# Patient Record
Sex: FEMALE | Race: WHITE | Employment: OTHER | URBAN - METROPOLITAN AREA
[De-identification: names, ages, dates, MRNs, and addresses within clinical notes are randomized per-mention and may not be internally consistent; named-entity substitution may affect disease eponyms.]

---

## 2018-03-21 ENCOUNTER — HOSPITAL ENCOUNTER (OUTPATIENT)
Age: 63
Discharge: HOME | End: 2018-03-23
Payer: MEDICARE

## 2018-03-21 ENCOUNTER — HOSPITAL ENCOUNTER (OUTPATIENT)
Dept: GENERAL RADIOLOGY | Age: 63
Discharge: HOME | End: 2018-03-23
Payer: MEDICARE

## 2018-03-21 DIAGNOSIS — R05.9 COUGH: ICD-10-CM

## 2018-03-21 PROCEDURE — 71046 X-RAY EXAM CHEST 2 VIEWS: CPT

## 2018-03-27 ENCOUNTER — HOSPITAL ENCOUNTER (OUTPATIENT)
Dept: GENERAL RADIOLOGY | Age: 63
Discharge: HOME | End: 2018-03-29
Payer: MEDICARE

## 2018-03-27 DIAGNOSIS — I88.9 AXILLARY LYMPHADENITIS: ICD-10-CM

## 2018-03-27 RX ORDER — HYDROXYZINE HYDROCHLORIDE 25 MG/1
25 TABLET, FILM COATED ORAL 3 TIMES DAILY PRN
COMMUNITY

## 2018-03-27 RX ORDER — DOXYCYCLINE HYCLATE 100 MG
100 TABLET ORAL 2 TIMES DAILY
COMMUNITY

## 2018-03-29 ENCOUNTER — PREP FOR PROCEDURE (OUTPATIENT)
Dept: UROLOGY | Age: 63
End: 2018-03-29

## 2018-03-29 RX ORDER — SODIUM CHLORIDE 0.9 % (FLUSH) 0.9 %
10 SYRINGE (ML) INJECTION PRN
Status: CANCELLED | OUTPATIENT
Start: 2018-03-29

## 2018-03-29 RX ORDER — SODIUM CHLORIDE 9 MG/ML
INJECTION, SOLUTION INTRAVENOUS CONTINUOUS
Status: CANCELLED | OUTPATIENT
Start: 2018-03-29

## 2018-03-29 RX ORDER — SODIUM CHLORIDE 0.9 % (FLUSH) 0.9 %
10 SYRINGE (ML) INJECTION EVERY 12 HOURS SCHEDULED
Status: CANCELLED | OUTPATIENT
Start: 2018-03-29

## 2018-04-05 ENCOUNTER — ANESTHESIA EVENT (OUTPATIENT)
Dept: OPERATING ROOM | Age: 63
End: 2018-04-05
Payer: MEDICARE

## 2018-04-05 ENCOUNTER — ANESTHESIA (OUTPATIENT)
Dept: OPERATING ROOM | Age: 63
End: 2018-04-05
Payer: MEDICARE

## 2018-04-05 ENCOUNTER — HOSPITAL ENCOUNTER (OUTPATIENT)
Dept: GENERAL RADIOLOGY | Age: 63
Discharge: HOME | End: 2018-04-07
Attending: UROLOGY
Payer: MEDICARE

## 2018-04-05 ENCOUNTER — HOSPITAL ENCOUNTER (OUTPATIENT)
Age: 63
Setting detail: OUTPATIENT SURGERY
Discharge: HOME | End: 2018-04-05
Attending: UROLOGY | Admitting: UROLOGY
Payer: MEDICARE

## 2018-04-05 VITALS — DIASTOLIC BLOOD PRESSURE: 59 MMHG | OXYGEN SATURATION: 99 % | SYSTOLIC BLOOD PRESSURE: 128 MMHG

## 2018-04-05 VITALS
BODY MASS INDEX: 24.24 KG/M2 | SYSTOLIC BLOOD PRESSURE: 162 MMHG | OXYGEN SATURATION: 100 % | HEIGHT: 64 IN | TEMPERATURE: 97.9 F | DIASTOLIC BLOOD PRESSURE: 70 MMHG | WEIGHT: 142 LBS | HEART RATE: 64 BPM | RESPIRATION RATE: 16 BRPM

## 2018-04-05 DIAGNOSIS — N13.30 HYDRONEPHROSIS, UNSPECIFIED HYDRONEPHROSIS TYPE: Primary | ICD-10-CM

## 2018-04-05 DIAGNOSIS — R52 PAIN: ICD-10-CM

## 2018-04-05 PROCEDURE — 3700000001 HC ADD 15 MINUTES (ANESTHESIA): Performed by: UROLOGY

## 2018-04-05 PROCEDURE — 6360000002 HC RX W HCPCS: Performed by: NURSE PRACTITIONER

## 2018-04-05 PROCEDURE — 3600000003 HC SURGERY LEVEL 3 BASE: Performed by: UROLOGY

## 2018-04-05 PROCEDURE — 3700000000 HC ANESTHESIA ATTENDED CARE: Performed by: UROLOGY

## 2018-04-05 PROCEDURE — 7100000011 HC PHASE II RECOVERY - ADDTL 15 MIN: Performed by: UROLOGY

## 2018-04-05 PROCEDURE — 88112 CYTOPATH CELL ENHANCE TECH: CPT

## 2018-04-05 PROCEDURE — 88305 TISSUE EXAM BY PATHOLOGIST: CPT

## 2018-04-05 PROCEDURE — C1758 CATHETER, URETERAL: HCPCS | Performed by: UROLOGY

## 2018-04-05 PROCEDURE — 87088 URINE BACTERIA CULTURE: CPT

## 2018-04-05 PROCEDURE — 6360000002 HC RX W HCPCS: Performed by: NURSE ANESTHETIST, CERTIFIED REGISTERED

## 2018-04-05 PROCEDURE — 7100000010 HC PHASE II RECOVERY - FIRST 15 MIN: Performed by: UROLOGY

## 2018-04-05 PROCEDURE — 2580000003 HC RX 258: Performed by: NURSE ANESTHETIST, CERTIFIED REGISTERED

## 2018-04-05 PROCEDURE — 74420 UROGRAPHY RTRGR +-KUB: CPT

## 2018-04-05 PROCEDURE — 3600000013 HC SURGERY LEVEL 3 ADDTL 15MIN: Performed by: UROLOGY

## 2018-04-05 RX ORDER — PROMETHAZINE HYDROCHLORIDE 25 MG/ML
6.25 INJECTION, SOLUTION INTRAMUSCULAR; INTRAVENOUS
Status: DISCONTINUED | OUTPATIENT
Start: 2018-04-05 | End: 2018-04-05 | Stop reason: HOSPADM

## 2018-04-05 RX ORDER — MIDAZOLAM HYDROCHLORIDE 1 MG/ML
INJECTION INTRAMUSCULAR; INTRAVENOUS PRN
Status: DISCONTINUED | OUTPATIENT
Start: 2018-04-05 | End: 2018-04-05 | Stop reason: SDUPTHER

## 2018-04-05 RX ORDER — SODIUM CHLORIDE 9 MG/ML
INJECTION, SOLUTION INTRAVENOUS CONTINUOUS PRN
Status: DISCONTINUED | OUTPATIENT
Start: 2018-04-05 | End: 2018-04-05 | Stop reason: SDUPTHER

## 2018-04-05 RX ORDER — DEXAMETHASONE SODIUM PHOSPHATE 4 MG/ML
INJECTION, SOLUTION INTRA-ARTICULAR; INTRALESIONAL; INTRAMUSCULAR; INTRAVENOUS; SOFT TISSUE PRN
Status: DISCONTINUED | OUTPATIENT
Start: 2018-04-05 | End: 2018-04-05 | Stop reason: SDUPTHER

## 2018-04-05 RX ORDER — DIPHENHYDRAMINE HYDROCHLORIDE 50 MG/ML
12.5 INJECTION INTRAMUSCULAR; INTRAVENOUS
Status: DISCONTINUED | OUTPATIENT
Start: 2018-04-05 | End: 2018-04-05 | Stop reason: HOSPADM

## 2018-04-05 RX ORDER — MEPERIDINE HYDROCHLORIDE 25 MG/ML
12.5 INJECTION INTRAMUSCULAR; INTRAVENOUS; SUBCUTANEOUS
Status: DISCONTINUED | OUTPATIENT
Start: 2018-04-05 | End: 2018-04-05 | Stop reason: HOSPADM

## 2018-04-05 RX ORDER — CEPHALEXIN 500 MG/1
500 CAPSULE ORAL 3 TIMES DAILY
Qty: 21 CAPSULE | Refills: 0 | Status: SHIPPED | OUTPATIENT
Start: 2018-04-05 | End: 2018-04-12

## 2018-04-05 RX ORDER — FENTANYL CITRATE 50 UG/ML
50 INJECTION, SOLUTION INTRAMUSCULAR; INTRAVENOUS EVERY 5 MIN PRN
Status: DISCONTINUED | OUTPATIENT
Start: 2018-04-05 | End: 2018-04-05 | Stop reason: HOSPADM

## 2018-04-05 RX ORDER — PROPOFOL 10 MG/ML
INJECTION, EMULSION INTRAVENOUS PRN
Status: DISCONTINUED | OUTPATIENT
Start: 2018-04-05 | End: 2018-04-05 | Stop reason: SDUPTHER

## 2018-04-05 RX ORDER — SODIUM CHLORIDE 0.9 % (FLUSH) 0.9 %
10 SYRINGE (ML) INJECTION PRN
Status: DISCONTINUED | OUTPATIENT
Start: 2018-04-05 | End: 2018-04-05 | Stop reason: HOSPADM

## 2018-04-05 RX ORDER — OXYCODONE HYDROCHLORIDE AND ACETAMINOPHEN 5; 325 MG/1; MG/1
1 TABLET ORAL
Status: DISCONTINUED | OUTPATIENT
Start: 2018-04-05 | End: 2018-04-05 | Stop reason: HOSPADM

## 2018-04-05 RX ORDER — DIPHENHYDRAMINE HYDROCHLORIDE 50 MG/ML
INJECTION INTRAMUSCULAR; INTRAVENOUS PRN
Status: DISCONTINUED | OUTPATIENT
Start: 2018-04-05 | End: 2018-04-05 | Stop reason: SDUPTHER

## 2018-04-05 RX ORDER — OXYCODONE HYDROCHLORIDE AND ACETAMINOPHEN 5; 325 MG/1; MG/1
1 TABLET ORAL EVERY 6 HOURS PRN
Status: DISCONTINUED | OUTPATIENT
Start: 2018-04-05 | End: 2018-04-05 | Stop reason: HOSPADM

## 2018-04-05 RX ORDER — ONDANSETRON 2 MG/ML
INJECTION INTRAMUSCULAR; INTRAVENOUS PRN
Status: DISCONTINUED | OUTPATIENT
Start: 2018-04-05 | End: 2018-04-05 | Stop reason: SDUPTHER

## 2018-04-05 RX ORDER — SODIUM CHLORIDE 9 MG/ML
INJECTION, SOLUTION INTRAVENOUS CONTINUOUS
Status: DISCONTINUED | OUTPATIENT
Start: 2018-04-05 | End: 2018-04-05 | Stop reason: HOSPADM

## 2018-04-05 RX ORDER — HYDROCODONE BITARTRATE AND ACETAMINOPHEN 5; 325 MG/1; MG/1
1 TABLET ORAL EVERY 4 HOURS PRN
Qty: 10 TABLET | Refills: 0 | Status: SHIPPED | OUTPATIENT
Start: 2018-04-05 | End: 2018-04-12

## 2018-04-05 RX ORDER — SODIUM CHLORIDE 0.9 % (FLUSH) 0.9 %
10 SYRINGE (ML) INJECTION EVERY 12 HOURS SCHEDULED
Status: DISCONTINUED | OUTPATIENT
Start: 2018-04-05 | End: 2018-04-05 | Stop reason: HOSPADM

## 2018-04-05 RX ORDER — MORPHINE SULFATE 2 MG/ML
2 INJECTION, SOLUTION INTRAMUSCULAR; INTRAVENOUS EVERY 4 HOURS PRN
Status: DISCONTINUED | OUTPATIENT
Start: 2018-04-05 | End: 2018-04-05 | Stop reason: HOSPADM

## 2018-04-05 RX ORDER — ONDANSETRON 2 MG/ML
4 INJECTION INTRAMUSCULAR; INTRAVENOUS EVERY 6 HOURS PRN
Status: DISCONTINUED | OUTPATIENT
Start: 2018-04-05 | End: 2018-04-05 | Stop reason: HOSPADM

## 2018-04-05 RX ORDER — FENTANYL CITRATE 50 UG/ML
INJECTION, SOLUTION INTRAMUSCULAR; INTRAVENOUS PRN
Status: DISCONTINUED | OUTPATIENT
Start: 2018-04-05 | End: 2018-04-05 | Stop reason: SDUPTHER

## 2018-04-05 RX ADMIN — PROPOFOL 300 MG: 10 INJECTION, EMULSION INTRAVENOUS at 14:40

## 2018-04-05 RX ADMIN — FENTANYL CITRATE 100 MCG: 50 INJECTION, SOLUTION INTRAMUSCULAR; INTRAVENOUS at 14:42

## 2018-04-05 RX ADMIN — SODIUM CHLORIDE: 9 INJECTION, SOLUTION INTRAVENOUS at 14:25

## 2018-04-05 RX ADMIN — ONDANSETRON 4 MG: 2 INJECTION, SOLUTION INTRAMUSCULAR; INTRAVENOUS at 14:41

## 2018-04-05 RX ADMIN — DEXAMETHASONE SODIUM PHOSPHATE 10 MG: 4 INJECTION, SOLUTION INTRA-ARTICULAR; INTRALESIONAL; INTRAMUSCULAR; INTRAVENOUS; SOFT TISSUE at 14:30

## 2018-04-05 RX ADMIN — CEFAZOLIN SODIUM 1.6 G: 2 SOLUTION INTRAVENOUS at 14:25

## 2018-04-05 RX ADMIN — DEXAMETHASONE SODIUM PHOSPHATE 10 MG: 4 INJECTION, SOLUTION INTRA-ARTICULAR; INTRALESIONAL; INTRAMUSCULAR; INTRAVENOUS; SOFT TISSUE at 14:41

## 2018-04-05 RX ADMIN — DIPHENHYDRAMINE HYDROCHLORIDE 50 MG: 50 INJECTION, SOLUTION INTRAMUSCULAR; INTRAVENOUS at 14:41

## 2018-04-05 RX ADMIN — MIDAZOLAM HYDROCHLORIDE 2 MG: 1 INJECTION, SOLUTION INTRAMUSCULAR; INTRAVENOUS at 14:30

## 2018-04-05 ASSESSMENT — PULMONARY FUNCTION TESTS
PIF_VALUE: 1
PIF_VALUE: 0
PIF_VALUE: 1
PIF_VALUE: 0
PIF_VALUE: 1
PIF_VALUE: 0
PIF_VALUE: 1
PIF_VALUE: 2
PIF_VALUE: 1

## 2018-04-05 ASSESSMENT — PAIN SCALES - GENERAL
PAINLEVEL_OUTOF10: 0

## 2018-04-05 ASSESSMENT — LIFESTYLE VARIABLES: SMOKING_STATUS: 1

## 2018-04-05 ASSESSMENT — PAIN - FUNCTIONAL ASSESSMENT: PAIN_FUNCTIONAL_ASSESSMENT: 0-10

## 2018-04-05 ASSESSMENT — ENCOUNTER SYMPTOMS: SHORTNESS OF BREATH: 0

## 2018-04-07 LAB — URINE CULTURE, ROUTINE: NORMAL

## 2018-04-10 ENCOUNTER — HOSPITAL ENCOUNTER (OUTPATIENT)
Dept: ULTRASOUND IMAGING | Age: 63
Discharge: HOME | End: 2018-04-12
Payer: MEDICARE

## 2018-04-10 DIAGNOSIS — D48.7 NEOPLASM OF UNCERTAIN BEHAVIOR OF OTHER SPECIFIED SITES: ICD-10-CM

## 2018-04-10 PROCEDURE — 76881 US COMPL JOINT R-T W/IMG: CPT

## 2018-06-25 ENCOUNTER — HOSPITAL ENCOUNTER (OUTPATIENT)
Age: 63
Discharge: HOME | End: 2018-06-27
Payer: MEDICARE

## 2018-06-25 ENCOUNTER — HOSPITAL ENCOUNTER (OUTPATIENT)
Dept: GENERAL RADIOLOGY | Age: 63
Discharge: HOME | End: 2018-06-27
Payer: MEDICARE

## 2018-06-25 DIAGNOSIS — M25.562 LEFT KNEE PAIN, UNSPECIFIED CHRONICITY: ICD-10-CM

## 2018-06-25 PROCEDURE — 73564 X-RAY EXAM KNEE 4 OR MORE: CPT

## 2018-06-26 ENCOUNTER — HOSPITAL ENCOUNTER (OUTPATIENT)
Dept: MRI IMAGING | Age: 63
Discharge: HOME | End: 2018-06-28
Payer: MEDICARE

## 2018-06-26 DIAGNOSIS — M25.562 LEFT KNEE PAIN, UNSPECIFIED CHRONICITY: ICD-10-CM

## 2018-06-26 DIAGNOSIS — M25.469 EFFUSION OF KNEE, UNSPECIFIED LATERALITY: ICD-10-CM

## 2018-06-26 DIAGNOSIS — M11.262 OTHER CHONDROCALCINOSIS, LEFT KNEE: ICD-10-CM

## 2018-06-26 PROCEDURE — 73721 MRI JNT OF LWR EXTRE W/O DYE: CPT

## 2018-08-08 ENCOUNTER — HOSPITAL ENCOUNTER (OUTPATIENT)
Age: 63
Discharge: HOME | End: 2018-08-10
Payer: MEDICARE

## 2018-08-08 ENCOUNTER — HOSPITAL ENCOUNTER (OUTPATIENT)
Dept: GENERAL RADIOLOGY | Age: 63
Discharge: HOME | End: 2018-08-10
Payer: MEDICARE

## 2018-08-08 DIAGNOSIS — M25.561 RIGHT KNEE PAIN, UNSPECIFIED CHRONICITY: ICD-10-CM

## 2018-08-08 PROCEDURE — 73564 X-RAY EXAM KNEE 4 OR MORE: CPT

## 2018-09-26 ENCOUNTER — HOSPITAL ENCOUNTER (OUTPATIENT)
Dept: SLEEP CENTER | Age: 63
Discharge: HOME | End: 2018-09-26
Payer: MEDICARE

## 2018-09-26 PROCEDURE — G0399 HOME SLEEP TEST/TYPE 3 PORTA: HCPCS

## 2018-09-28 ENCOUNTER — HOSPITAL ENCOUNTER (OUTPATIENT)
Dept: SLEEP CENTER | Age: 63
Discharge: HOME | End: 2018-09-28
Payer: MEDICARE

## 2018-09-28 PROCEDURE — G0399 HOME SLEEP TEST/TYPE 3 PORTA: HCPCS

## 2018-10-03 ENCOUNTER — HOSPITAL ENCOUNTER (OUTPATIENT)
Dept: CT IMAGING | Age: 63
Discharge: HOME | End: 2018-10-05
Payer: MEDICARE

## 2018-10-03 DIAGNOSIS — R22.1 LOCALIZED SWELLING, MASS AND LUMP, NECK: ICD-10-CM

## 2018-10-03 PROCEDURE — 6360000004 HC RX CONTRAST MEDICATION: Performed by: RADIOLOGY

## 2018-10-03 PROCEDURE — 70491 CT SOFT TISSUE NECK W/DYE: CPT

## 2018-10-03 RX ADMIN — IOPAMIDOL 100 ML: 755 INJECTION, SOLUTION INTRAVENOUS at 16:20

## 2018-10-13 NOTE — PROGRESS NOTES
[address] [address]                              SLEEP STUDY REPORT    PATIENT NAME: Karena Tolentino                    :         1955  MED REC NO:   [id number]                            ROOM:  ACCOUNT NO:   [de-identified]                           ADMIT DATE:  2018  PROVIDER:     Orlin Morgan MD    DATE OF STUDY:  2018    HOME SLEEP APNEA TEST    ORDERING PHYSICIAN:  Dr. Celeste Caraballo. INDICATION FOR TESTING:  The patient had snoring, restless sleep,  morning headaches, trouble with memory and concentration, leg jerks,  sleep talking, and trouble falling and staying asleep. The patient  also has excessive daytime sleepiness. CURRENT MEDICATIONS:  Include levothyroxine, citalopram, WelChol,  Zyrtec, and latanoprost.    The patient had a neck circumference of 17 inches, a BMI of 22.2 with  an Anderson of 8/24. FINDINGS:  As follows: Total recording time was 9 hours and 14  minutes. It started at 9:52 p.m. and ended up at 7:06 a.m. RESPIRATORY EVENTS:  During the study, the patient had a total 5  apneas and had 22 hypopneas. The apnea-hypopnea index was 3. OXYGEN DESATURATIONS:  The patient's lowest oxygen saturation during  the study was only 86%. The patient spent 6% of the night, 34 minutes  were spent with oxygen saturations below 90%. IMPRESSION:  1. No significant sleep apnea. 2.  Nocturnal oxygen desaturations. RECOMMENDATIONS:  1.   Dr. Celeste Caraballo to discuss the results of the test with the  patient. 2.  Clinical correlation and considering supplemental oxygen  for the patient at nighttime.         Mary Lees MD      D: 10/12/2018 15:39:48       T: 10/13/2018 0:36:14     [id number]  Job#: [id number]     Doc#: [id number]:

## 2018-11-20 ENCOUNTER — HOSPITAL ENCOUNTER (OUTPATIENT)
Dept: CT IMAGING | Age: 63
Discharge: HOME | End: 2018-11-22
Payer: MEDICARE

## 2018-11-20 DIAGNOSIS — R06.02 BREATH SHORTNESS: ICD-10-CM

## 2018-11-20 DIAGNOSIS — R09.02 HYPOXEMIA: ICD-10-CM

## 2018-11-20 PROCEDURE — 71260 CT THORAX DX C+: CPT

## 2018-11-20 PROCEDURE — 2580000003 HC RX 258: Performed by: RADIOLOGY

## 2018-11-20 PROCEDURE — 6360000004 HC RX CONTRAST MEDICATION: Performed by: RADIOLOGY

## 2018-11-20 RX ORDER — SODIUM CHLORIDE 0.9 % (FLUSH) 0.9 %
10 SYRINGE (ML) INJECTION 2 TIMES DAILY
Status: DISCONTINUED | OUTPATIENT
Start: 2018-11-20 | End: 2018-11-23 | Stop reason: HOSPADM

## 2018-11-20 RX ADMIN — Medication 10 ML: at 09:12

## 2018-11-20 RX ADMIN — IOPAMIDOL 100 ML: 755 INJECTION, SOLUTION INTRAVENOUS at 09:12

## 2019-10-10 ENCOUNTER — HOSPITAL ENCOUNTER (OUTPATIENT)
Dept: GENERAL RADIOLOGY | Age: 64
Discharge: HOME | End: 2019-10-12
Payer: MEDICARE

## 2019-10-10 DIAGNOSIS — R22.9 LOCALIZED SWELLING, MASS AND LUMP, UNSPECIFIED: ICD-10-CM

## 2019-10-10 PROCEDURE — 76882 US LMTD JT/FCL EVL NVASC XTR: CPT

## 2019-10-18 ENCOUNTER — HOSPITAL ENCOUNTER (OUTPATIENT)
Age: 64
Discharge: HOME | End: 2019-10-18
Payer: MEDICARE

## 2019-10-18 LAB
CRYPTOSPORIDIUM ANTIGEN STOOL: NORMAL
GIARDIA ANTIGEN STOOL: NORMAL

## 2019-10-18 PROCEDURE — 87329 GIARDIA AG IA: CPT

## 2019-10-18 PROCEDURE — 87328 CRYPTOSPORIDIUM AG IA: CPT

## 2019-10-18 PROCEDURE — 87045 FECES CULTURE AEROBIC BACT: CPT

## 2019-10-20 LAB — CULTURE, STOOL: NORMAL

## 2020-01-14 ENCOUNTER — HOSPITAL ENCOUNTER (OUTPATIENT)
Dept: GENERAL RADIOLOGY | Age: 65
Discharge: HOME | End: 2020-01-16
Payer: MEDICARE

## 2020-01-14 ENCOUNTER — HOSPITAL ENCOUNTER (OUTPATIENT)
Age: 65
Discharge: HOME | End: 2020-01-16
Payer: MEDICARE

## 2020-01-14 PROCEDURE — 71046 X-RAY EXAM CHEST 2 VIEWS: CPT

## 2020-01-15 ENCOUNTER — HOSPITAL ENCOUNTER (OUTPATIENT)
Dept: CT IMAGING | Age: 65
Discharge: HOME | End: 2020-01-17
Payer: MEDICARE

## 2020-01-15 PROCEDURE — 74176 CT ABD & PELVIS W/O CONTRAST: CPT

## 2020-01-15 PROCEDURE — 6360000004 HC RX CONTRAST MEDICATION: Performed by: RADIOLOGY

## 2020-01-15 RX ADMIN — IOHEXOL 50 ML: 240 INJECTION, SOLUTION INTRATHECAL; INTRAVASCULAR; INTRAVENOUS; ORAL at 13:34

## 2020-01-16 ENCOUNTER — HOSPITAL ENCOUNTER (OUTPATIENT)
Dept: CT IMAGING | Age: 65
Discharge: HOME | End: 2020-01-18
Payer: MEDICARE

## 2020-01-16 PROCEDURE — 2580000003 HC RX 258: Performed by: RADIOLOGY

## 2020-01-16 PROCEDURE — 6360000004 HC RX CONTRAST MEDICATION: Performed by: RADIOLOGY

## 2020-01-16 PROCEDURE — 71275 CT ANGIOGRAPHY CHEST: CPT

## 2020-01-16 RX ORDER — SODIUM CHLORIDE 0.9 % (FLUSH) 0.9 %
10 SYRINGE (ML) INJECTION 2 TIMES DAILY
Status: DISCONTINUED | OUTPATIENT
Start: 2020-01-16 | End: 2020-01-19 | Stop reason: HOSPADM

## 2020-01-16 RX ADMIN — IOPAMIDOL 100 ML: 755 INJECTION, SOLUTION INTRAVENOUS at 11:33

## 2020-01-16 RX ADMIN — Medication 10 ML: at 11:33

## 2020-02-18 ENCOUNTER — HOSPITAL ENCOUNTER (OUTPATIENT)
Age: 65
Discharge: HOME | End: 2020-02-20

## 2020-02-18 PROCEDURE — 88305 TISSUE EXAM BY PATHOLOGIST: CPT

## 2020-03-03 ENCOUNTER — HOSPITAL ENCOUNTER (OUTPATIENT)
Dept: MAMMOGRAPHY | Age: 65
Discharge: HOME | End: 2020-03-05
Payer: MEDICARE

## 2020-03-03 PROCEDURE — 77063 BREAST TOMOSYNTHESIS BI: CPT

## 2021-02-27 ENCOUNTER — IMMUNIZATION (OUTPATIENT)
Dept: PRIMARY CARE CLINIC | Age: 66
End: 2021-02-27
Payer: MEDICARE

## 2021-02-27 PROCEDURE — 0001A COVID-19, PFIZER VACCINE 30MCG/0.3ML DOSE: CPT | Performed by: INTERNAL MEDICINE

## 2021-02-27 PROCEDURE — 91300 COVID-19, PFIZER VACCINE 30MCG/0.3ML DOSE: CPT | Performed by: INTERNAL MEDICINE

## 2021-03-02 ENCOUNTER — TELEPHONE (OUTPATIENT)
Dept: CARDIOLOGY | Age: 66
End: 2021-03-02

## 2021-03-02 NOTE — TELEPHONE ENCOUNTER
PATIENT CALLED TO SCHEDULE ECHO & NUC STRESS TEST  FOR 03-12-21. NO AUTH NEEDED PER DR. LARA OFFICE  REVIEWED COVID CHECKLIST WITH PATIENT.     Electronically signed by Gareth Stallworth on 3/2/2021 at 10:36 AM

## 2021-03-09 ENCOUNTER — HOSPITAL ENCOUNTER (OUTPATIENT)
Dept: ULTRASOUND IMAGING | Age: 66
Discharge: HOME | End: 2021-03-11
Payer: MEDICARE

## 2021-03-09 ENCOUNTER — HOSPITAL ENCOUNTER (OUTPATIENT)
Age: 66
Discharge: HOME | End: 2021-03-11
Payer: MEDICARE

## 2021-03-09 ENCOUNTER — HOSPITAL ENCOUNTER (OUTPATIENT)
Dept: MAMMOGRAPHY | Age: 66
Discharge: HOME | End: 2021-03-11
Payer: MEDICARE

## 2021-03-09 DIAGNOSIS — R06.09 CHRONIC DYSPNEA: ICD-10-CM

## 2021-03-09 DIAGNOSIS — I10 ESSENTIAL HYPERTENSION, BENIGN: ICD-10-CM

## 2021-03-09 DIAGNOSIS — R03.0 ELEVATED BLOOD PRESSURE READING WITHOUT DIAGNOSIS OF HYPERTENSION: ICD-10-CM

## 2021-03-09 DIAGNOSIS — Z12.31 ENCOUNTER FOR SCREENING MAMMOGRAM FOR MALIGNANT NEOPLASM OF BREAST: ICD-10-CM

## 2021-03-09 PROCEDURE — 76770 US EXAM ABDO BACK WALL COMP: CPT

## 2021-03-09 PROCEDURE — 77063 BREAST TOMOSYNTHESIS BI: CPT

## 2021-03-10 ENCOUNTER — TELEPHONE (OUTPATIENT)
Dept: CARDIOLOGY | Age: 66
End: 2021-03-10

## 2021-03-10 NOTE — TELEPHONE ENCOUNTER
[phone] 03/10/21 Spoke with Isaias Chin Reminder Call for Nuclear Exercise Stress test 03/12/21 @0800 (echocardiogram @ 0915)  included Pre procedure stress instructions and COVID check list. Bring Albuterol if using regularly. She acknowledged understanding.

## 2021-03-12 ENCOUNTER — HOSPITAL ENCOUNTER (OUTPATIENT)
Dept: CARDIOLOGY | Age: 66
Discharge: HOME | End: 2021-03-12
Payer: MEDICARE

## 2021-03-12 VITALS
WEIGHT: 145 LBS | TEMPERATURE: 98.2 F | SYSTOLIC BLOOD PRESSURE: 138 MMHG | DIASTOLIC BLOOD PRESSURE: 70 MMHG | HEIGHT: 64 IN | HEART RATE: 76 BPM | BODY MASS INDEX: 24.75 KG/M2

## 2021-03-12 LAB
LV EF: 65 %
LV EF: 75 %
LVEF MODALITY: NORMAL
LVEF MODALITY: NORMAL

## 2021-03-12 PROCEDURE — 3430000000 HC RX DIAGNOSTIC RADIOPHARMACEUTICAL: Performed by: INTERNAL MEDICINE

## 2021-03-12 PROCEDURE — 93306 TTE W/DOPPLER COMPLETE: CPT

## 2021-03-12 PROCEDURE — 78452 HT MUSCLE IMAGE SPECT MULT: CPT

## 2021-03-12 PROCEDURE — 93017 CV STRESS TEST TRACING ONLY: CPT

## 2021-03-12 PROCEDURE — A9500 TC99M SESTAMIBI: HCPCS | Performed by: INTERNAL MEDICINE

## 2021-03-12 PROCEDURE — 2580000003 HC RX 258: Performed by: INTERNAL MEDICINE

## 2021-03-12 RX ORDER — AMITRIPTYLINE HYDROCHLORIDE 10 MG/1
10 TABLET, FILM COATED ORAL NIGHTLY
COMMUNITY

## 2021-03-12 RX ORDER — MULTIVITAMIN WITH IRON
250 TABLET ORAL DAILY
COMMUNITY

## 2021-03-12 RX ORDER — M-VIT,TX,IRON,MINS/CALC/FOLIC 27MG-0.4MG
1 TABLET ORAL DAILY
COMMUNITY

## 2021-03-12 RX ORDER — SODIUM CHLORIDE 0.9 % (FLUSH) 0.9 %
10 SYRINGE (ML) INJECTION PRN
Status: DISCONTINUED | OUTPATIENT
Start: 2021-03-12 | End: 2021-03-13 | Stop reason: HOSPADM

## 2021-03-12 RX ORDER — AMLODIPINE BESYLATE 5 MG/1
5 TABLET ORAL DAILY
COMMUNITY

## 2021-03-12 RX ADMIN — Medication 31.6 MILLICURIE: at 10:30

## 2021-03-12 RX ADMIN — Medication 10.7 MILLICURIE: at 08:18

## 2021-03-12 RX ADMIN — SODIUM CHLORIDE, PRESERVATIVE FREE 10 ML: 5 INJECTION INTRAVENOUS at 08:18

## 2021-03-12 RX ADMIN — SODIUM CHLORIDE, PRESERVATIVE FREE 10 ML: 5 INJECTION INTRAVENOUS at 10:30

## 2021-03-12 NOTE — PROCEDURES
[address] [facility] [address]  [phone]                Exercise Stress Nuclear Gated SPECT Study    Name: [address] Account Number: [de-identified]    :  1955      Sex: female              Date of Study:  3/12/2021    Height: 5' 4\" (162.6 cm)  Weight: 145 lb (65.8 kg)     Ordering Provider: Tatiana Pineda. Norma Giraldo MD          PCP: Raul Garcia MD      Cardiologist: Bonnie Herzog                        Interpreting Physician: Bay Niño MD  _________________________________________________________________________________    Indication:   Detecting the presence and location of coronary artery disease    Clinical History:   Patient has no known history of coronary artery disease. Resting ECG:    Normal sinus rhythm, septal MI age undetermined, abnormal EKG. Exercise: The patient exercised using a Blair protocol, completing 8.00 minutes and reaching an estimated work load of 67.2 metabolic equivalents (METS). Resting HR was 76. Peak exercise heart rate was 130 ( 84% of maximum predicted heart rate for age). Baseline /70. Peak exercise /78. The blood pressure response to exercise was hypertensive      Exercise was terminated due to dyspnea, patient request, fatigue and heart rate attained. The patient experienced no chest pain with exercise. Exercise ECG:   The patient demonstrated no arrhythmias during exercise. With exercise, the test was abnormal.    With exercise up to 1 mm of horizontal ST depression was noted in leads II, V5 and V6 with initial changes beginning at 7 minutes into exercise, at a heart rate of 124. These changes these changes persisted 2 minutes into recovery period. The predictive value for ischemia was low. Villagran treadmill score was 3 implying intermediate risk.      IMAGING: Myocardial perfusion imaging was performed at rest 30-35 minutes following the intravenous injection of 10.7 mCi of (Tc-Sestamibi) followed by 10 ml of Normal Saline. At peak exercise, the patient was injected intravenously with 31.6 mCi of (Tc-Sestamibi) followed by 10 ml of Normal Saline. Gated post-stress tomographic imaging was performed 20-25 minutes after stress. FINDINGS: The overall quality of the study was good. Left ventricular cavity size was noted to be normal.    Rotational analog analysis demonstrated no patient motion or abnormal extracardiac radioactivity. The gated SPECT stress imaging in the short, vertical long, and horizontal long axis demonstrated normal homogeneous tracer distribution throughout the myocardium. The resting images show no change. Gated SPECT left ventricular ejection fraction was calculated to be >75%, with hyperdynamic LV function and normal wall motion. TID 0.94    Impression:    1. Exercise EKG was positive with intermediate predictive value for ischemia. 2. The patient experienced no chest pain with exercise. 3. The myocardial perfusion imaging was normal.    4. Overall left ventricular systolic function was normal without regional wall motion abnormalities. 5. Villagran treadmill score was 3 implying intermediate risk. 6. Exercise capacity was above average. 7. There was an appropriate heart rate and blood pressure response to exercise. Heart rate recovery was abnormal.  8. Low risk general exercise treadmill test.    Thank you for sending your patient to this [facility].      Electronically signed by Higinio Hernandez MD on 3/12/21 at 3:18 PM EST

## 2021-03-16 ENCOUNTER — HOSPITAL ENCOUNTER (OUTPATIENT)
Dept: CT IMAGING | Age: 66
Discharge: HOME | End: 2021-03-18
Payer: MEDICARE

## 2021-03-16 DIAGNOSIS — K11.7 DISTURBANCE OF SALIVARY SECRETION: ICD-10-CM

## 2021-03-16 DIAGNOSIS — G50.1 ATYPICAL FACE PAIN: ICD-10-CM

## 2021-03-16 PROCEDURE — 2580000003 HC RX 258: Performed by: RADIOLOGY

## 2021-03-16 PROCEDURE — 6360000004 HC RX CONTRAST MEDICATION: Performed by: RADIOLOGY

## 2021-03-16 PROCEDURE — 70487 CT MAXILLOFACIAL W/DYE: CPT

## 2021-03-16 RX ORDER — SODIUM CHLORIDE 0.9 % (FLUSH) 0.9 %
10 SYRINGE (ML) INJECTION 2 TIMES DAILY
Status: DISCONTINUED | OUTPATIENT
Start: 2021-03-16 | End: 2021-03-19 | Stop reason: HOSPADM

## 2021-03-16 RX ADMIN — IOPAMIDOL 100 ML: 755 INJECTION, SOLUTION INTRAVENOUS at 14:09

## 2021-03-16 RX ADMIN — Medication 10 ML: at 14:09

## 2021-03-23 ENCOUNTER — IMMUNIZATION (OUTPATIENT)
Dept: PRIMARY CARE CLINIC | Age: 66
End: 2021-03-23
Payer: MEDICARE

## 2021-03-23 PROCEDURE — 0002A COVID-19, PFIZER VACCINE 30MCG/0.3ML DOSE: CPT | Performed by: NURSE PRACTITIONER

## 2021-03-23 PROCEDURE — 91300 COVID-19, PFIZER VACCINE 30MCG/0.3ML DOSE: CPT | Performed by: NURSE PRACTITIONER

## 2021-03-29 ENCOUNTER — HOSPITAL ENCOUNTER (OUTPATIENT)
Age: 66
Discharge: HOME | End: 2021-03-31

## 2021-03-29 PROCEDURE — 88305 TISSUE EXAM BY PATHOLOGIST: CPT

## 2021-03-29 PROCEDURE — 88342 IMHCHEM/IMCYTCHM 1ST ANTB: CPT

## 2021-05-14 ENCOUNTER — HOSPITAL ENCOUNTER (OUTPATIENT)
Age: 66
Setting detail: SPECIMEN
Discharge: HOME | End: 2021-05-14
Payer: MEDICARE

## 2021-05-14 LAB
REASON FOR REJECTION: NORMAL
REJECTED TEST: NORMAL
WHITE BLOOD CELLS (WBC), STOOL: NORMAL

## 2021-05-14 PROCEDURE — 87045 FECES CULTURE AEROBIC BACT: CPT

## 2021-05-14 PROCEDURE — 89055 LEUKOCYTE ASSESSMENT FECAL: CPT

## 2021-05-14 PROCEDURE — 82705 FATS/LIPIDS FECES QUAL: CPT

## 2021-05-16 LAB — CULTURE, STOOL: NORMAL

## 2021-05-17 LAB
FECAL NEUTRAL FAT: NORMAL
FECAL SPLIT FATS: NORMAL

## 2021-05-24 ENCOUNTER — HOSPITAL ENCOUNTER (OUTPATIENT)
Dept: ULTRASOUND IMAGING | Age: 66
Discharge: HOME | End: 2021-05-26
Payer: MEDICARE

## 2021-05-24 ENCOUNTER — HOSPITAL ENCOUNTER (OUTPATIENT)
Age: 66
Discharge: HOME | End: 2021-05-26
Payer: MEDICARE

## 2021-05-24 DIAGNOSIS — I71.40 ABDOMINAL AORTIC ANEURYSM WITHOUT RUPTURE: ICD-10-CM

## 2021-05-24 PROCEDURE — 76775 US EXAM ABDO BACK WALL LIM: CPT

## 2021-07-29 ENCOUNTER — HOSPITAL ENCOUNTER (OUTPATIENT)
Dept: GENERAL RADIOLOGY | Age: 66
Discharge: HOME | End: 2021-07-31
Payer: MEDICARE

## 2021-07-29 ENCOUNTER — HOSPITAL ENCOUNTER (OUTPATIENT)
Age: 66
Discharge: HOME | End: 2021-07-31
Payer: MEDICARE

## 2021-07-29 DIAGNOSIS — R07.9 CHEST PAIN, UNSPECIFIED TYPE: ICD-10-CM

## 2021-07-29 PROCEDURE — 71046 X-RAY EXAM CHEST 2 VIEWS: CPT

## 2022-01-03 ENCOUNTER — HOSPITAL ENCOUNTER (OUTPATIENT)
Dept: CT IMAGING | Age: 67
Discharge: HOME | End: 2022-01-05
Payer: MEDICARE

## 2022-01-03 DIAGNOSIS — R10.32 ABDOMINAL PAIN, LEFT LOWER QUADRANT: ICD-10-CM

## 2022-01-03 DIAGNOSIS — R10.9 STOMACH ACHE: ICD-10-CM

## 2022-01-03 PROCEDURE — 6360000004 HC RX CONTRAST MEDICATION: Performed by: RADIOLOGY

## 2022-01-03 PROCEDURE — 74176 CT ABD & PELVIS W/O CONTRAST: CPT

## 2022-01-03 RX ADMIN — IOHEXOL 50 ML: 240 INJECTION, SOLUTION INTRATHECAL; INTRAVASCULAR; INTRAVENOUS; ORAL at 11:50

## 2022-01-18 ENCOUNTER — HOSPITAL ENCOUNTER (OUTPATIENT)
Age: 67
Discharge: HOME | End: 2022-01-20

## 2022-01-18 PROCEDURE — 88305 TISSUE EXAM BY PATHOLOGIST: CPT

## 2022-03-09 ENCOUNTER — HOSPITAL ENCOUNTER (OUTPATIENT)
Dept: GENERAL RADIOLOGY | Age: 67
Discharge: HOME | End: 2022-03-11
Payer: MEDICARE

## 2022-03-09 ENCOUNTER — HOSPITAL ENCOUNTER (OUTPATIENT)
Age: 67
Discharge: HOME | End: 2022-03-11
Payer: MEDICARE

## 2022-03-09 DIAGNOSIS — R05.9 COMPLAINING OF COUGH: ICD-10-CM

## 2022-03-09 PROCEDURE — 71046 X-RAY EXAM CHEST 2 VIEWS: CPT

## 2022-06-19 SDOH — HEALTH STABILITY: PHYSICAL HEALTH: ON AVERAGE, HOW MANY DAYS PER WEEK DO YOU ENGAGE IN MODERATE TO STRENUOUS EXERCISE (LIKE A BRISK WALK)?: 3 DAYS

## 2022-06-19 SDOH — HEALTH STABILITY: PHYSICAL HEALTH: ON AVERAGE, HOW MANY MINUTES DO YOU ENGAGE IN EXERCISE AT THIS LEVEL?: 20 MIN

## 2022-06-21 ENCOUNTER — OFFICE VISIT (OUTPATIENT)
Dept: ORTHOPEDIC SURGERY | Age: 67
End: 2022-06-21
Payer: MEDICARE

## 2022-06-21 VITALS — WEIGHT: 140 LBS | BODY MASS INDEX: 23.32 KG/M2 | HEIGHT: 65 IN

## 2022-06-21 DIAGNOSIS — G89.29 CHRONIC BILATERAL LOW BACK PAIN WITHOUT SCIATICA: ICD-10-CM

## 2022-06-21 DIAGNOSIS — M25.562 PAIN IN BOTH KNEES, UNSPECIFIED CHRONICITY: Primary | ICD-10-CM

## 2022-06-21 DIAGNOSIS — M25.561 PAIN IN BOTH KNEES, UNSPECIFIED CHRONICITY: Primary | ICD-10-CM

## 2022-06-21 DIAGNOSIS — M54.50 CHRONIC BILATERAL LOW BACK PAIN WITHOUT SCIATICA: ICD-10-CM

## 2022-06-21 PROCEDURE — 3017F COLORECTAL CA SCREEN DOC REV: CPT | Performed by: ORTHOPAEDIC SURGERY

## 2022-06-21 PROCEDURE — 20610 DRAIN/INJ JOINT/BURSA W/O US: CPT | Performed by: ORTHOPAEDIC SURGERY

## 2022-06-21 PROCEDURE — G8420 CALC BMI NORM PARAMETERS: HCPCS | Performed by: ORTHOPAEDIC SURGERY

## 2022-06-21 PROCEDURE — G8427 DOCREV CUR MEDS BY ELIG CLIN: HCPCS | Performed by: ORTHOPAEDIC SURGERY

## 2022-06-21 PROCEDURE — G8400 PT W/DXA NO RESULTS DOC: HCPCS | Performed by: ORTHOPAEDIC SURGERY

## 2022-06-21 PROCEDURE — 99203 OFFICE O/P NEW LOW 30 MIN: CPT | Performed by: ORTHOPAEDIC SURGERY

## 2022-06-21 PROCEDURE — 4004F PT TOBACCO SCREEN RCVD TLK: CPT | Performed by: ORTHOPAEDIC SURGERY

## 2022-06-21 PROCEDURE — 1090F PRES/ABSN URINE INCON ASSESS: CPT | Performed by: ORTHOPAEDIC SURGERY

## 2022-06-21 PROCEDURE — 1123F ACP DISCUSS/DSCN MKR DOCD: CPT | Performed by: ORTHOPAEDIC SURGERY

## 2022-06-21 RX ORDER — OMEPRAZOLE 40 MG/1
CAPSULE, DELAYED RELEASE ORAL
COMMUNITY
Start: 2022-04-24

## 2022-06-21 RX ORDER — LATANOPROST 50 UG/ML
SOLUTION/ DROPS OPHTHALMIC
COMMUNITY
Start: 2022-06-01

## 2022-06-21 RX ORDER — AZELASTINE 1 MG/ML
SPRAY, METERED NASAL
COMMUNITY
Start: 2022-05-06

## 2022-06-21 RX ORDER — LEVOTHYROXINE SODIUM 0.03 MG/1
TABLET ORAL
COMMUNITY
Start: 2015-10-05 | End: 2022-06-21 | Stop reason: SDUPTHER

## 2022-06-21 RX ORDER — BENZONATATE 200 MG/1
CAPSULE ORAL
COMMUNITY
Start: 2017-01-09

## 2022-06-21 RX ORDER — BUPIVACAINE HYDROCHLORIDE 2.5 MG/ML
3 INJECTION, SOLUTION INFILTRATION; PERINEURAL ONCE
Status: COMPLETED | OUTPATIENT
Start: 2022-06-21 | End: 2022-06-21

## 2022-06-21 RX ORDER — TRIAMCINOLONE ACETONIDE 40 MG/ML
80 INJECTION, SUSPENSION INTRA-ARTICULAR; INTRAMUSCULAR ONCE
Status: COMPLETED | OUTPATIENT
Start: 2022-06-21 | End: 2022-06-21

## 2022-06-21 RX ADMIN — TRIAMCINOLONE ACETONIDE 80 MG: 40 INJECTION, SUSPENSION INTRA-ARTICULAR; INTRAMUSCULAR at 12:06

## 2022-06-21 RX ADMIN — BUPIVACAINE HYDROCHLORIDE 7.5 MG: 2.5 INJECTION, SOLUTION INFILTRATION; PERINEURAL at 12:05

## 2022-06-21 NOTE — PROGRESS NOTES
Chief Complaint:   Chief Complaint   Patient presents with    Knee Pain     Bilateral knee pain x several years. No hx of knee surgery. Pain with steps, getting up from floor, flexion and extension. No recent X-rays. JONATHAN Schneider is a 78 y.o. female, who presents with chronic and recently progressive aggravated and more disabling bilateral knee pain mostly anterior aspect, now to the point where she has difficulty with any ADLs that involve bending standing or climbing. No injury, when she was seen here years ago diagnosed with some degree of patellofemoral arthritis. She has been taking over-the-counter's including ibuprofen with partial relief. She has no other peripheral joint complaints but she does have chronic back pain associated with stiffness this too is limiting her activities. Denies radiating sciatic type symptoms in the lower extremities however. Allergies; medications; past medical, surgical, family, and social history; and problem list have been reviewed today and updated as indicated in this encounter - see below following Ortho specifics. Musculoskeletal: Thin healthy-appearing middle-aged female, upper extremities grossly unremarkable leg lengths are equal hip motion is painless, both knees are normally aligned straight and stable to medial lateral and AP stress but she has significant anterior compartment pain with some lateral tracking of the patella, significant apprehension noted with flexion extension although with relaxation she is able to achieve normal passive range of motion. There is retropatellar crepitus noted bilaterally.     Radiologic Studies: AP lateral x-ray exam of both knees show very mild arthritis on the AP view with a little bit of lateral joint space narrowing, most significantly on the lateral view of both knees there is complete loss of patellofemoral joint space with sclerosis osteophyte formation and even some degree of anterior femoral APPENDECTOMY      CARDIAC CATHETERIZATION  2001    results were clear    CARDIOVASCULAR STRESS TEST  2015    CHOLECYSTECTOMY  2012? Lap    COLONOSCOPY  1/9/2012    CYSTOURETHROSCOPY/STENT REMOVAL  2006? to insert stent, to treat hydrophronosis    ENDOSCOPY, COLON, DIAGNOSTIC  1/9/2012    results irritated esphagus and stomach    ENDOSCOPY, COLON, DIAGNOSTIC  12/15/15    HYSTERECTOMY (CERVIX STATUS UNKNOWN)  1992    partial     [place] CYSTO/URETERO/PYELOSCOPY W/LITHOTRIPSY N/A 4/5/2018    CYSTOSCOPY RETROGRADE, BLADDER BIOPSY.  performed by Jessy No MD at [address] [facility]. S.W N/A 4/5/2018    POSSIBLE BLADDER BIOPSY   ++STAFF FROM ROOM 10++ performed by Jessy No MD at [facility]  2010    right    THYROIDECTOMY, PARTIAL Right 4/10/2015       Current Outpatient Medications   Medication Sig Dispense Refill    azelastine (ASTELIN) 0.1 % nasal spray INSTILL 2 SPRAYS INTO EACH NOSTRIL TWICE DAILY      benzonatate (TESSALON) 200 MG capsule Take by mouth      latanoprost (XALATAN) 0.005 % ophthalmic solution INSTILL 1 DROP IN BOTH EYES EVERY NIGHT AT BEDTIME      omeprazole (PRILOSEC) 40 MG delayed release capsule TAKE 1 CAPSULE BY MOUTH EVERY DAY      zinc 50 MG CAPS       Multiple Vitamins-Minerals (THERAPEUTIC MULTIVITAMIN-MINERALS) tablet Take 1 tablet by mouth daily      amLODIPine (NORVASC) 5 MG tablet Take 5 mg by mouth daily      magnesium (MAGNESIUM-OXIDE) 250 MG TABS tablet Take 250 mg by mouth daily      Calcium Carbonate-Vitamin D (CALCIUM 500 + D PO) Take by mouth daily Ld 3/30/2018  Not taking      levothyroxine (SYNTHROID) 25 MCG tablet Take 25 mcg by mouth Daily      Cetirizine HCl (ZYRTEC ALLERGY) 10 MG CAPS Take 1 tablet by mouth daily       Naproxen Sodium (ALEVE PO) Take 2 tablets by mouth as needed Last dose 3/30/2018      Ascorbic Acid (VITAMIN C) 500 MG tablet Take 500 mg by mouth daily Last dose 3/30/2018 not taking      B Complex Vitamins (VITAMIN B COMPLEX PO) Take 1 tablet by mouth daily Last dose 3/30/2018      amitriptyline (ELAVIL) 10 MG tablet Take 10 mg by mouth nightly 20 mg at nightime      Cholecalciferol (VITAMIN D3 PO) Take by mouth 4000 daily      Glucosamine Sulfate 1000 MG TABS Take by mouth daily Ld 3/30/2018  Not taking      hydrOXYzine (ATARAX) 25 MG tablet Take 25 mg by mouth 3 times daily as needed for Itching Completed      doxycycline hyclate (VIBRA-TABS) 100 MG tablet Take 100 mg by mouth 2 times daily X 10 days / to treat walking pneumonia / started 3/21/2018  Completed      colesevelam (WELCHOL) 625 MG tablet Take 625 mg by mouth 4 times daily      Albuterol Sulfate (PROAIR HFA IN) Inhale into the lungs as needed Instructed to take am of procedure if needs and bring dos not using      citalopram (CELEXA) 20 MG tablet Take 40 mg by mouth every morning Instructed to take am of procedure not taking       No current facility-administered medications for this visit. Allergies   Allergen Reactions    Ancef [Cefazolin] Hives    Sulfa Antibiotics Hives       Social History     Socioeconomic History    Marital status:      Spouse name: None    Number of children: None    Years of education: None    Highest education level: None   Occupational History    None   Tobacco Use    Smoking status: Current Every Day Smoker     Packs/day: 0.50     Years: 45.00     Pack years: 22.50     Types: Cigarettes    Smokeless tobacco: Never Used   Vaping Use    Vaping Use: Former   Substance and Sexual Activity    Alcohol use:  Yes     Alcohol/week: 14.0 standard drinks     Types: 14 Cans of beer per week    Drug use: No    Sexual activity: None   Other Topics Concern    None   Social History Narrative    None     Social Determinants of Health     Financial Resource Strain:     Difficulty of Paying Living Expenses: Not on file   Food Insecurity:     Worried About Running Out of Food in the Last Year: Not on file    Ran Out of Food in the Last Year: Not on file   Transportation Needs:     Lack of Transportation (Medical): Not on file    Lack of Transportation (Non-Medical): Not on file   Physical Activity: Insufficiently Active    Days of Exercise per Week: 3 days    Minutes of Exercise per Session: 20 min   Stress:     Feeling of Stress : Not on file   Social Connections:     Frequency of Communication with Friends and Family: Not on file    Frequency of Social Gatherings with Friends and Family: Not on file    Attends Sikh Services: Not on file    Active Member of Clubs or Organizations: Not on file    Attends Club or Organization Meetings: Not on file    Marital Status: Not on file   Intimate Partner Violence: Not At Risk    Fear of Current or Ex-Partner: No    Emotionally Abused: No    Physically Abused: No    Sexually Abused: No   Housing Stability:     Unable to Pay for Housing in the Last Year: Not on file    Number of [place] in the Last Year: Not on file    Unstable Housing in the Last Year: Not on file       Family History   Problem Relation Age of Onset    Cancer Mother         esophageal nonsmoker    Heart Disease Father 55        several hear attacks     Dementia Sister 68        nursing home    No Known Problems Brother         age 70 great health         Review of Systems  As follows except as previously noted in HPI:  Constitutional: Negative for chills, diaphoresis, fatigue, fever and unexpected weight change. Respiratory: Negative for cough, shortness of breath and wheezing. Cardiovascular: Negative for chest pain and palpitations. Neurological: Negative for dizziness, syncope, cephalgia. GI / : negative  Musculoskeletal: see HPI       Objective:   Physical Exam   Constitutional: Oriented to person, place, and time. and appears well-developed and well-nourished. :   Head: Normocephalic and atraumatic. Eyes: EOM are normal.   Neck: Neck supple. Cardiovascular: Normal rate and regular rhythm. Pulmonary/Chest: Effort normal. No stridor. No respiratory distress, no wheezes. Abdominal:  No abnormal distension. Neurological: Alert and oriented to person, place, and time. Skin: Skin is warm and dry. Psychiatric: Normal mood and affect.  Behavior is normal. Thought content normal.

## 2022-06-27 ENCOUNTER — HOSPITAL ENCOUNTER (OUTPATIENT)
Age: 67
Discharge: HOME | End: 2022-06-29
Payer: MEDICARE

## 2022-06-27 ENCOUNTER — HOSPITAL ENCOUNTER (OUTPATIENT)
Dept: GENERAL RADIOLOGY | Age: 67
Discharge: HOME | End: 2022-06-29
Payer: MEDICARE

## 2022-06-27 DIAGNOSIS — R05.9 COUGH: ICD-10-CM

## 2022-06-27 PROCEDURE — 71046 X-RAY EXAM CHEST 2 VIEWS: CPT

## 2022-07-07 ENCOUNTER — HOSPITAL ENCOUNTER (OUTPATIENT)
Dept: MAMMOGRAPHY | Age: 67
Discharge: HOME | End: 2022-07-09
Payer: MEDICARE

## 2022-07-07 DIAGNOSIS — Z12.31 ENCOUNTER FOR SCREENING MAMMOGRAM FOR MALIGNANT NEOPLASM OF BREAST: ICD-10-CM

## 2022-07-07 PROCEDURE — 77063 BREAST TOMOSYNTHESIS BI: CPT

## 2022-08-04 ENCOUNTER — OFFICE VISIT (OUTPATIENT)
Dept: ORTHOPEDIC SURGERY | Age: 67
End: 2022-08-04
Payer: MEDICARE

## 2022-08-04 VITALS — WEIGHT: 140 LBS | HEIGHT: 65 IN | BODY MASS INDEX: 23.32 KG/M2

## 2022-08-04 DIAGNOSIS — M25.561 PAIN IN BOTH KNEES, UNSPECIFIED CHRONICITY: Primary | ICD-10-CM

## 2022-08-04 DIAGNOSIS — M25.562 PAIN IN BOTH KNEES, UNSPECIFIED CHRONICITY: Primary | ICD-10-CM

## 2022-08-04 PROCEDURE — G8427 DOCREV CUR MEDS BY ELIG CLIN: HCPCS | Performed by: ORTHOPAEDIC SURGERY

## 2022-08-04 PROCEDURE — 3017F COLORECTAL CA SCREEN DOC REV: CPT | Performed by: ORTHOPAEDIC SURGERY

## 2022-08-04 PROCEDURE — 1090F PRES/ABSN URINE INCON ASSESS: CPT | Performed by: ORTHOPAEDIC SURGERY

## 2022-08-04 PROCEDURE — 4004F PT TOBACCO SCREEN RCVD TLK: CPT | Performed by: ORTHOPAEDIC SURGERY

## 2022-08-04 PROCEDURE — 1123F ACP DISCUSS/DSCN MKR DOCD: CPT | Performed by: ORTHOPAEDIC SURGERY

## 2022-08-04 PROCEDURE — G8400 PT W/DXA NO RESULTS DOC: HCPCS | Performed by: ORTHOPAEDIC SURGERY

## 2022-08-04 PROCEDURE — 99213 OFFICE O/P EST LOW 20 MIN: CPT | Performed by: ORTHOPAEDIC SURGERY

## 2022-08-04 PROCEDURE — G8420 CALC BMI NORM PARAMETERS: HCPCS | Performed by: ORTHOPAEDIC SURGERY

## 2022-08-04 NOTE — PROGRESS NOTES
Chief Complaint:   Chief Complaint   Patient presents with    Knee Pain     FU Bilateral knee pain. Pain has improved with injections and PTx. Catie Barros reports moderate appreciable improvement in bilateral knee pain following injections and ongoing physical therapy. She is improving and her range of motion and daily functions are better as well especially in range of motion and ambulation. Still taking occasional over-the-counter nonsteroidals as needed for pain. Allergies; medications; past medical, surgical, family, and social history; and problem list have been reviewed today and updated as indicated in this encounter seen below. Exam: Bilateral knee exam shows again retropatellar crepitation through normal range of motion, no active erythema or effusion, patellar tracking is generally lateral but stable through range of motion. Radiographs: Deferred today prior x-rays showing patellofemoral arthritis bilateral knees. Noah Peoples was seen today for knee pain. Diagnoses and all orders for this visit:    Pain in both knees, unspecified chronicity     Diagnosis and treatment options were reviewed, patient will continue with therapeutic exercise and take over-the-counter's as needed, we talked about repeating injections on intervals of 4 months or so and the possible progression toward arthroplasty in the indefinite future. Questions asked and answered follow-up as needed. Return if symptoms worsen or fail to improve.      Current Outpatient Medications   Medication Sig Dispense Refill    azelastine (ASTELIN) 0.1 % nasal spray INSTILL 2 SPRAYS INTO EACH NOSTRIL TWICE DAILY      latanoprost (XALATAN) 0.005 % ophthalmic solution INSTILL 1 DROP IN BOTH EYES EVERY NIGHT AT BEDTIME      omeprazole (PRILOSEC) 40 MG delayed release capsule TAKE 1 CAPSULE BY MOUTH EVERY DAY      zinc 50 MG CAPS       Multiple Vitamins-Minerals (THERAPEUTIC MULTIVITAMIN-MINERALS) tablet Take 1 tablet by mouth daily      amLODIPine (NORVASC) 5 MG tablet Take 5 mg by mouth daily      magnesium (MAGNESIUM-OXIDE) 250 MG TABS tablet Take 250 mg by mouth daily      Calcium Carbonate-Vitamin D (CALCIUM 500 + D PO) Take by mouth daily Ld 3/30/2018  Not taking      levothyroxine (SYNTHROID) 25 MCG tablet Take 25 mcg by mouth Daily      Cetirizine HCl 10 MG CAPS Take 1 tablet by mouth daily       Naproxen Sodium (ALEVE PO) Take 2 tablets by mouth as needed Last dose 3/30/2018      Ascorbic Acid (VITAMIN C) 500 MG tablet Take 500 mg by mouth daily Last dose 3/30/2018 not taking      B Complex Vitamins (VITAMIN B COMPLEX PO) Take 1 tablet by mouth daily Last dose 3/30/2018      benzonatate (TESSALON) 200 MG capsule Take by mouth (Patient not taking: Reported on 8/4/2022)      amitriptyline (ELAVIL) 10 MG tablet Take 10 mg by mouth nightly 20 mg at nightime      Cholecalciferol (VITAMIN D3 PO) Take by mouth 4000 daily      Glucosamine Sulfate 1000 MG TABS Take by mouth daily Ld 3/30/2018  Not taking      hydrOXYzine (ATARAX) 25 MG tablet Take 25 mg by mouth 3 times daily as needed for Itching Completed      doxycycline hyclate (VIBRA-TABS) 100 MG tablet Take 100 mg by mouth 2 times daily X 10 days / to treat walking pneumonia / started 3/21/2018  Completed      colesevelam (WELCHOL) 625 MG tablet Take 625 mg by mouth 4 times daily      Albuterol Sulfate (PROAIR HFA IN) Inhale into the lungs as needed Instructed to take am of procedure if needs and bring dos not using      citalopram (CELEXA) 20 MG tablet Take 40 mg by mouth every morning Instructed to take am of procedure not taking       No current facility-administered medications for this visit.        Patient Active Problem List   Diagnosis    Thyroid mass    Hydronephrosis       Past Medical History:   Diagnosis Date    Arthritis     thumbs    Asthma     stable    Cancer ([facility] [address] 1992    cervical, treated with hysterectomy    Environmental allergies     Hydronephrosis     right Itching     comes and goes / etiology unknown    Kidney stone     right    Thyroid disease     Walking pneumonia     diagnosed 3/21/2018, started antibiotic       Past Surgical History:   Procedure Laterality Date    APPENDECTOMY      CARDIAC CATHETERIZATION  2001    results were clear    CARDIOVASCULAR STRESS TEST  2015    CHOLECYSTECTOMY  2012? Lap    COLONOSCOPY  1/9/2012    CYSTOURETHROSCOPY/STENT REMOVAL  2006? to insert stent, to treat hydrophronosis    ENDOSCOPY, COLON, DIAGNOSTIC  1/9/2012    results irritated esphagus and stomach    ENDOSCOPY, COLON, DIAGNOSTIC  12/15/15    HYSTERECTOMY (CERVIX STATUS UNKNOWN)  1992    partial     [place] CYSTO/URETERO/PYELOSCOPY W/LITHOTRIPSY N/A 4/5/2018    CYSTOSCOPY RETROGRADE, BLADDER BIOPSY. performed by Richa Vogel MD at [facility] N/A 4/5/2018    POSSIBLE BLADDER BIOPSY   ++STAFF FROM ROOM 10++ performed by Richa Vogel MD at [address]  2010    right    THYROIDECTOMY, PARTIAL Right 4/10/2015       Allergies   Allergen Reactions    Ancef [Cefazolin] Hives    Sulfa Antibiotics Hives       Social History     Socioeconomic History    Marital status:      Spouse name: None    Number of children: None    Years of education: None    Highest education level: None   Tobacco Use    Smoking status: Every Day     Packs/day: 0.50     Years: 45.00     Pack years: 22.50     Types: Cigarettes    Smokeless tobacco: Never   Vaping Use    Vaping Use: Former   Substance and Sexual Activity    Alcohol use:  Yes     Alcohol/week: 14.0 standard drinks     Types: 14 Cans of beer per week    Drug use: No     Social Determinants of Health     Physical Activity: Insufficiently Active    Days of Exercise per Week: 3 days    Minutes of Exercise per Session: 20 min   Intimate Partner Violence: Not At Risk    Fear of Current or Ex-Partner: No    Emotionally Abused: No    Physically Abused: No    Sexually Abused: No       Family

## 2022-12-30 ENCOUNTER — TELEPHONE (OUTPATIENT)
Dept: CASE MANAGEMENT | Age: 67
End: 2022-12-30

## 2022-12-30 NOTE — TELEPHONE ENCOUNTER
I called the patient and she confirmed her CT lung screening at [facility] on 1/3/2023 at 2:00 pm.  I reminded the patient to arrive at 1:30 pm, enter through the main entrance, and register. Patient confirmed.             Electronically signed by Dontrell Gracia on 12/30/22 at 2:42 PM EST

## 2023-01-03 ENCOUNTER — HOSPITAL ENCOUNTER (OUTPATIENT)
Dept: CT IMAGING | Age: 68
Discharge: HOME | End: 2023-01-05
Payer: MEDICARE

## 2023-01-03 ENCOUNTER — HOSPITAL ENCOUNTER (OUTPATIENT)
Dept: GENERAL RADIOLOGY | Age: 68
Discharge: HOME | End: 2023-01-05
Payer: MEDICARE

## 2023-01-03 DIAGNOSIS — M54.50 LOW BACK PAIN, UNSPECIFIED BACK PAIN LATERALITY, UNSPECIFIED CHRONICITY, UNSPECIFIED WHETHER SCIATICA PRESENT: ICD-10-CM

## 2023-01-03 DIAGNOSIS — F17.210 CIGARETTE SMOKER: ICD-10-CM

## 2023-01-03 PROCEDURE — 72110 X-RAY EXAM L-2 SPINE 4/>VWS: CPT

## 2023-01-03 PROCEDURE — 71271 CT THORAX LUNG CANCER SCR C-: CPT

## 2023-01-04 ENCOUNTER — TELEPHONE (OUTPATIENT)
Dept: CASE MANAGEMENT | Age: 68
End: 2023-01-04

## 2023-01-04 NOTE — TELEPHONE ENCOUNTER
No call, encounter opened to process CT Lung Screening. CT Lung Screen: 1/3/2023    Impression   1. There is no pulmonary infiltrate, mass or suspicious pulmonary nodule. LUNG RADS:   Per ACR Lung-RADS Version 1.1       Category 1, Negative (No nodules and definitely benign nodules). Management: Continue annual lung screening with LDCT in 12 months. RECOMMENDATIONS:   If you would like to [facility] your [facility], please contact the Nurse Navigator at   [phone]. Pack years: 22.5    Social History     Tobacco Use  Smoking Status: Current Every Day Smoker    Start Date:    Quit Date:    Types: Cigarettes   Packs/Day: 0.5   Years: 39   Pack Years: 22.5   Smokeless Tobacco: Never         Results letter sent to patient via my chart or mailed.      [address]

## 2023-01-06 ENCOUNTER — HOSPITAL ENCOUNTER (OUTPATIENT)
Dept: MRI IMAGING | Age: 68
End: 2023-01-06
Payer: MEDICARE

## 2023-01-06 DIAGNOSIS — M54.16 LUMBAR RADICULOPATHY: ICD-10-CM

## 2023-01-06 PROCEDURE — 72148 MRI LUMBAR SPINE W/O DYE: CPT

## 2023-03-28 ENCOUNTER — HOSPITAL ENCOUNTER (OUTPATIENT)
Dept: ULTRASOUND IMAGING | Age: 68
Discharge: HOME | End: 2023-03-30
Payer: MEDICARE

## 2023-03-28 DIAGNOSIS — I73.9 PERIPHERAL VASCULAR DISEASE, UNSPECIFIED (HCC): ICD-10-CM

## 2023-03-28 PROCEDURE — 93925 LOWER EXTREMITY STUDY: CPT

## 2023-05-18 ENCOUNTER — HOSPITAL ENCOUNTER (OUTPATIENT)
Age: 68
Discharge: HOME | End: 2023-05-20
Payer: MEDICARE

## 2023-07-17 ENCOUNTER — HOSPITAL ENCOUNTER (OUTPATIENT)
Dept: GENERAL RADIOLOGY | Age: 68
Discharge: HOME | End: 2023-07-19
Attending: FAMILY MEDICINE
Payer: MEDICARE

## 2023-07-17 ENCOUNTER — HOSPITAL ENCOUNTER (OUTPATIENT)
Age: 68
Discharge: HOME | End: 2023-07-19
Payer: MEDICARE

## 2023-07-17 DIAGNOSIS — R10.9 ABDOMINAL PAIN, UNSPECIFIED ABDOMINAL LOCATION: ICD-10-CM

## 2023-07-17 PROCEDURE — 74018 RADEX ABDOMEN 1 VIEW: CPT

## 2023-07-24 ENCOUNTER — HOSPITAL ENCOUNTER (OUTPATIENT)
Dept: CT IMAGING | Age: 68
Discharge: HOME | End: 2023-07-26
Payer: MEDICARE

## 2023-07-24 DIAGNOSIS — R10.9 STOMACH ACHE: ICD-10-CM

## 2023-07-24 PROCEDURE — 74177 CT ABD & PELVIS W/CONTRAST: CPT

## 2023-07-24 PROCEDURE — 2580000003 HC RX 258: Performed by: RADIOLOGY

## 2023-07-24 PROCEDURE — 6360000004 HC RX CONTRAST MEDICATION: Performed by: RADIOLOGY

## 2023-07-24 RX ORDER — SODIUM CHLORIDE 0.9 % (FLUSH) 0.9 %
10 SYRINGE (ML) INJECTION PRN
Status: DISCONTINUED | OUTPATIENT
Start: 2023-07-24 | End: 2023-07-27 | Stop reason: HOSPADM

## 2023-07-24 RX ADMIN — IOPAMIDOL 50 ML: 755 INJECTION, SOLUTION INTRAVENOUS at 11:52

## 2023-07-24 RX ADMIN — SODIUM CHLORIDE, PRESERVATIVE FREE 10 ML: 5 INJECTION INTRAVENOUS at 11:52

## 2023-07-24 RX ADMIN — IOPAMIDOL 18 ML: 755 INJECTION, SOLUTION INTRAVENOUS at 11:52

## 2023-08-29 RX ORDER — DOXAZOSIN MESYLATE 1 MG/1
1 TABLET ORAL NIGHTLY
COMMUNITY

## 2023-08-29 RX ORDER — NAPROXEN 500 MG/1
500 TABLET ORAL 2 TIMES DAILY WITH MEALS
COMMUNITY

## 2023-08-29 NOTE — PROGRESS NOTES
[employer] PRE-ADMISSION TESTING INSTRUCTIONS    The Preadmission Testing patient is instructed accordingly using the following criteria (check applicable):    ARRIVAL INSTRUCTIONS:  [x] Parking the day of Surgery is located in the Main Entrance lot. Upon entering the door, make an immediate right to the surgery reception desk    [x] Bring photo ID and insurance card    [x] Bring in a copy of Living will or Durable Power of  papers. [x] Please be sure to arrange for responsible adult to provide transportation to and from the hospital    [x] Please arrange for responsible adult to be with you for the 24 hour period post procedure due to having anesthesia    [x] If you awake am of surgery not feeling well or have temperature >100 please call [phone]    GENERAL INSTRUCTIONS:    [x] Nothing by mouth after midnight, including gum, candy, mints or water    [x] You may brush your teeth, but do not swallow any water    [x] Take medications as instructed with 1-2 oz of water    [x] Stop herbal supplements and vitamins 5 days prior to procedure    [] Follow preop dosing of blood thinners per physician instructions    [] Take 1/2 dose of evening insulin, but no insulin after midnight    [] No oral diabetic medications after midnight    [] If diabetic and have low blood sugar or feel symptomatic, take 1-2oz apple juice only    [] Bring inhalers day of surgery    [] Bring C-PAP/ Bi-Pap day of surgery    [] Bring urine specimen day of surgery    [x] Shower or bath with soap, lather and rinse well, AM of Surgery, no lotion, powders or creams to surgical site    [] Follow bowel prep as instructed per surgeon    [x] No tobacco products within 24 hours of surgery     [x] No alcohol or illegal drug use within 24 hours of surgery.     [x] Jewelry, body piercing's, eyeglasses, contact lenses and dentures are not permitted into surgery (bring cases)      [x] Please do not wear any nail polish, make up or hair products on the day of surgery    [x] You can expect a call the business day prior to procedure to notify you if your arrival time changes    [x] If you receive a survey after surgery we would greatly appreciate your comments    [] Parent/guardian of a minor must accompany their child and remain on the premises  the entire time they are under our care     [] Pediatric patients may bring favorite toy, blanket or comfort item with them    [] A caregiver or family member must remain with the patient during their stay if they are mentally handicapped, have dementia, disoriented or unable to use a call light or would be a safety concern if left unattended    [x] Please notify surgeon if you develop any illness between now and time of surgery (cold, cough, sore throat, fever, nausea, vomiting) or any signs of infections  including skin, wounds, and dental.    [x]  The Outpatient Pharmacy is available to fill your prescription here on your day of surgery, ask your preop nurse for details    [] Other instructions    EDUCATIONAL MATERIALS PROVIDED:    [] PAT Preoperative Education Packet/Booklet     [] Medication List    [] Transfusion bracelet applied with instructions    [] Shower with soap, lather and rinse well, and use CHG wipes provided the evening before surgery as instructed    [] Incentive spirometer with instructions

## 2023-08-30 ENCOUNTER — PREP FOR PROCEDURE (OUTPATIENT)
Dept: UROLOGY | Age: 68
End: 2023-08-30

## 2023-08-30 ENCOUNTER — ANESTHESIA EVENT (OUTPATIENT)
Dept: OPERATING ROOM | Age: 68
End: 2023-08-30
Payer: MEDICARE

## 2023-08-30 RX ORDER — SODIUM CHLORIDE 9 MG/ML
INJECTION, SOLUTION INTRAVENOUS PRN
Status: CANCELLED | OUTPATIENT
Start: 2023-08-30

## 2023-08-30 RX ORDER — SODIUM CHLORIDE 0.9 % (FLUSH) 0.9 %
5-40 SYRINGE (ML) INJECTION PRN
Status: CANCELLED | OUTPATIENT
Start: 2023-08-30

## 2023-08-30 RX ORDER — SODIUM CHLORIDE 9 MG/ML
INJECTION, SOLUTION INTRAVENOUS CONTINUOUS
Status: CANCELLED | OUTPATIENT
Start: 2023-08-30

## 2023-08-30 RX ORDER — SODIUM CHLORIDE 0.9 % (FLUSH) 0.9 %
5-40 SYRINGE (ML) INJECTION EVERY 12 HOURS SCHEDULED
Status: CANCELLED | OUTPATIENT
Start: 2023-08-30

## 2023-08-31 ENCOUNTER — APPOINTMENT (OUTPATIENT)
Dept: GENERAL RADIOLOGY | Age: 68
End: 2023-08-31
Attending: UROLOGY
Payer: MEDICARE

## 2023-08-31 ENCOUNTER — ANESTHESIA (OUTPATIENT)
Dept: OPERATING ROOM | Age: 68
End: 2023-08-31
Payer: MEDICARE

## 2023-08-31 ENCOUNTER — HOSPITAL ENCOUNTER (OUTPATIENT)
Age: 68
Setting detail: OUTPATIENT SURGERY
Discharge: HOME | End: 2023-08-31
Attending: UROLOGY | Admitting: UROLOGY
Payer: MEDICARE

## 2023-08-31 VITALS
SYSTOLIC BLOOD PRESSURE: 128 MMHG | DIASTOLIC BLOOD PRESSURE: 58 MMHG | HEIGHT: 65 IN | TEMPERATURE: 96.9 F | WEIGHT: 128 LBS | HEART RATE: 68 BPM | BODY MASS INDEX: 21.33 KG/M2 | OXYGEN SATURATION: 100 % | RESPIRATION RATE: 18 BRPM

## 2023-08-31 DIAGNOSIS — R31.0 GROSS HEMATURIA: ICD-10-CM

## 2023-08-31 PROCEDURE — 6360000004 HC RX CONTRAST MEDICATION: Performed by: UROLOGY

## 2023-08-31 PROCEDURE — 3700000000 HC ANESTHESIA ATTENDED CARE: Performed by: UROLOGY

## 2023-08-31 PROCEDURE — 74420 UROGRAPHY RTRGR +-KUB: CPT

## 2023-08-31 PROCEDURE — 87086 URINE CULTURE/COLONY COUNT: CPT

## 2023-08-31 PROCEDURE — 3700000001 HC ADD 15 MINUTES (ANESTHESIA): Performed by: UROLOGY

## 2023-08-31 PROCEDURE — 7100000001 HC PACU RECOVERY - ADDTL 15 MIN: Performed by: UROLOGY

## 2023-08-31 PROCEDURE — 3600000012 HC SURGERY LEVEL 2 ADDTL 15MIN: Performed by: UROLOGY

## 2023-08-31 PROCEDURE — 7100000011 HC PHASE II RECOVERY - ADDTL 15 MIN: Performed by: UROLOGY

## 2023-08-31 PROCEDURE — 3600000002 HC SURGERY LEVEL 2 BASE: Performed by: UROLOGY

## 2023-08-31 PROCEDURE — 6360000002 HC RX W HCPCS

## 2023-08-31 PROCEDURE — 7100000010 HC PHASE II RECOVERY - FIRST 15 MIN: Performed by: UROLOGY

## 2023-08-31 PROCEDURE — 2580000003 HC RX 258

## 2023-08-31 PROCEDURE — 2709999900 HC NON-CHARGEABLE SUPPLY: Performed by: UROLOGY

## 2023-08-31 PROCEDURE — C1758 CATHETER, URETERAL: HCPCS | Performed by: UROLOGY

## 2023-08-31 PROCEDURE — 7100000000 HC PACU RECOVERY - FIRST 15 MIN: Performed by: UROLOGY

## 2023-08-31 PROCEDURE — 88112 CYTOPATH CELL ENHANCE TECH: CPT

## 2023-08-31 RX ORDER — SODIUM CHLORIDE 0.9 % (FLUSH) 0.9 %
5-40 SYRINGE (ML) INJECTION EVERY 12 HOURS SCHEDULED
Status: DISCONTINUED | OUTPATIENT
Start: 2023-08-31 | End: 2023-08-31 | Stop reason: HOSPADM

## 2023-08-31 RX ORDER — SODIUM CHLORIDE 0.9 % (FLUSH) 0.9 %
5-40 SYRINGE (ML) INJECTION PRN
Status: DISCONTINUED | OUTPATIENT
Start: 2023-08-31 | End: 2023-08-31 | Stop reason: HOSPADM

## 2023-08-31 RX ORDER — SODIUM CHLORIDE 9 MG/ML
INJECTION, SOLUTION INTRAVENOUS CONTINUOUS
Status: DISCONTINUED | OUTPATIENT
Start: 2023-08-31 | End: 2023-08-31 | Stop reason: HOSPADM

## 2023-08-31 RX ORDER — ONDANSETRON 2 MG/ML
4 INJECTION INTRAMUSCULAR; INTRAVENOUS
Status: DISCONTINUED | OUTPATIENT
Start: 2023-08-31 | End: 2023-08-31 | Stop reason: HOSPADM

## 2023-08-31 RX ORDER — MIDAZOLAM HYDROCHLORIDE 1 MG/ML
INJECTION INTRAMUSCULAR; INTRAVENOUS PRN
Status: DISCONTINUED | OUTPATIENT
Start: 2023-08-31 | End: 2023-08-31 | Stop reason: SDUPTHER

## 2023-08-31 RX ORDER — LEVOFLOXACIN 5 MG/ML
500 INJECTION, SOLUTION INTRAVENOUS
Status: COMPLETED | OUTPATIENT
Start: 2023-08-31 | End: 2023-08-31

## 2023-08-31 RX ORDER — FENTANYL CITRATE 50 UG/ML
25 INJECTION, SOLUTION INTRAMUSCULAR; INTRAVENOUS EVERY 5 MIN PRN
Status: DISCONTINUED | OUTPATIENT
Start: 2023-08-31 | End: 2023-08-31 | Stop reason: HOSPADM

## 2023-08-31 RX ORDER — FENTANYL CITRATE 50 UG/ML
INJECTION, SOLUTION INTRAMUSCULAR; INTRAVENOUS PRN
Status: DISCONTINUED | OUTPATIENT
Start: 2023-08-31 | End: 2023-08-31 | Stop reason: SDUPTHER

## 2023-08-31 RX ORDER — OXYCODONE HYDROCHLORIDE AND ACETAMINOPHEN 5; 325 MG/1; MG/1
1 TABLET ORAL EVERY 6 HOURS PRN
Status: DISCONTINUED | OUTPATIENT
Start: 2023-08-31 | End: 2023-08-31 | Stop reason: HOSPADM

## 2023-08-31 RX ORDER — ONDANSETRON 2 MG/ML
4 INJECTION INTRAMUSCULAR; INTRAVENOUS EVERY 6 HOURS PRN
Status: DISCONTINUED | OUTPATIENT
Start: 2023-08-31 | End: 2023-08-31 | Stop reason: HOSPADM

## 2023-08-31 RX ORDER — PROCHLORPERAZINE EDISYLATE 5 MG/ML
5 INJECTION INTRAMUSCULAR; INTRAVENOUS
Status: DISCONTINUED | OUTPATIENT
Start: 2023-08-31 | End: 2023-08-31 | Stop reason: HOSPADM

## 2023-08-31 RX ORDER — SODIUM CHLORIDE 9 MG/ML
INJECTION, SOLUTION INTRAVENOUS PRN
Status: DISCONTINUED | OUTPATIENT
Start: 2023-08-31 | End: 2023-08-31 | Stop reason: HOSPADM

## 2023-08-31 RX ORDER — PROPOFOL 10 MG/ML
INJECTION, EMULSION INTRAVENOUS CONTINUOUS PRN
Status: DISCONTINUED | OUTPATIENT
Start: 2023-08-31 | End: 2023-08-31 | Stop reason: SDUPTHER

## 2023-08-31 RX ORDER — ONDANSETRON 2 MG/ML
INJECTION INTRAMUSCULAR; INTRAVENOUS PRN
Status: DISCONTINUED | OUTPATIENT
Start: 2023-08-31 | End: 2023-08-31 | Stop reason: SDUPTHER

## 2023-08-31 RX ORDER — DEXTROSE MONOHYDRATE 100 MG/ML
INJECTION, SOLUTION INTRAVENOUS CONTINUOUS PRN
Status: DISCONTINUED | OUTPATIENT
Start: 2023-08-31 | End: 2023-08-31 | Stop reason: HOSPADM

## 2023-08-31 RX ADMIN — LEVOFLOXACIN 500 MG: 5 INJECTION, SOLUTION INTRAVENOUS at 07:14

## 2023-08-31 RX ADMIN — SODIUM CHLORIDE: 9 INJECTION, SOLUTION INTRAVENOUS at 07:54

## 2023-08-31 RX ADMIN — FENTANYL CITRATE 50 MCG: 50 INJECTION, SOLUTION INTRAMUSCULAR; INTRAVENOUS at 07:59

## 2023-08-31 RX ADMIN — PROPOFOL 100 MCG/KG/MIN: 10 INJECTION, EMULSION INTRAVENOUS at 07:59

## 2023-08-31 RX ADMIN — FENTANYL CITRATE 25 MCG: 50 INJECTION, SOLUTION INTRAMUSCULAR; INTRAVENOUS at 08:10

## 2023-08-31 RX ADMIN — MIDAZOLAM 1 MG: 1 INJECTION INTRAMUSCULAR; INTRAVENOUS at 07:54

## 2023-08-31 RX ADMIN — ONDANSETRON 4 MG: 2 INJECTION INTRAMUSCULAR; INTRAVENOUS at 08:19

## 2023-08-31 RX ADMIN — MIDAZOLAM 1 MG: 1 INJECTION INTRAMUSCULAR; INTRAVENOUS at 07:59

## 2023-08-31 ASSESSMENT — PAIN DESCRIPTION - DESCRIPTORS: DESCRIPTORS: SHARP

## 2023-08-31 ASSESSMENT — PAIN DESCRIPTION - LOCATION
LOCATION: ABDOMEN
LOCATION: ABDOMEN

## 2023-08-31 ASSESSMENT — PAIN DESCRIPTION - PAIN TYPE: TYPE: SURGICAL PAIN

## 2023-08-31 ASSESSMENT — LIFESTYLE VARIABLES: SMOKING_STATUS: 1

## 2023-08-31 ASSESSMENT — PAIN SCALES - GENERAL
PAINLEVEL_OUTOF10: 0

## 2023-08-31 ASSESSMENT — PAIN - FUNCTIONAL ASSESSMENT
PAIN_FUNCTIONAL_ASSESSMENT: ACTIVITIES ARE NOT PREVENTED
PAIN_FUNCTIONAL_ASSESSMENT: 0-10

## 2023-08-31 NOTE — H&P
8/31/2023 7:50 AM  Service: Urology  Group: [facility] (Rodney/Rashmi/Teo)    Arita Kussmaul  [phone]     Chief Complaint: Right lower quadrant pain history of ureteropelvic junction obstruction and microhematuria with tobacco use    History of Present Illness: The patient is a 70 y.o. female patient who presents with lower abdominal pain since July 21 she eventually saw Dr. Charly Magana who gave her a nerve block to the area which has not really resolved her problem she had a CAT scan which did not redemonstrate a UPJ that she had seen on a previous CAT scan before her CAT scan was July 24, 2023. She had nocturia X1-2 intermittency at times never gross hematuria no urgency she has a sensation of residual.  She has stress urinary continence with for she has not needed to wear a pad. She drinks 2 cups of coffee a day. No diuretic. She is a smoker half pack a day for 40 years. She had no fever chills nausea vomiting with her symptoms. Patient had partial hysterectomy in the past for cervical cancer but had surgery only in 1992 no pelvic radiation and there was some question of adhesions raised to her possibly by the general surgeon. She has been on 3 antibiotics and some of them cause rashes and measurements when she is talking about but she said that she had antibiotics in the past and I did not have any problems she has not had a oophorectomy. She has no constipation diarrhea or blood in her stool. She does have gastroesophageal reflux. I tried her on doxazosin 1 mg a day and Naprosyn 5 mg twice a day since Monday and it has not relieved her symptoms.       The patient had a cystoscopy and retrograde program and a stent insertion for her hydronephrosis which was thought to be a UPJ this was done in 2006  Elderly cystoscopy retrograde pyelogram bladder biopsy in 2018 without evidence of malignancy  Past Medical History:   Diagnosis Date    Arthritis     thumbs    Asthma     stable    Cancer ([address]) 1992

## 2023-08-31 NOTE — BRIEF OP NOTE
Brief Postoperative Note      Patient: Sera Pablo  YOB: 1955  MRN: [id number]    Date of Procedure: 8/31/2023    Pre-Op diagnosis. Microhematuria right lower quadrant pain history of tobacco usage history of right UPJ obstruction history of alcohol use    Post-Op Diagnosis: Right incomplete ureteropelvic junction obstruction needing no treatment . normal left collecting system suspect benign  microscopic hematuria. Residual contrast in colonic diverticula suspect resolving diverticulitis/urethral stenosis/possible hypotonic bladder possibly alcoholic induced. No obvious cause for the right inguinal or lower abdominal       Procedure: Cystoscopy and bilateral retrograde pyelograms /urethral dilatation/examined anesthesia    Surgeon(s):  Drake Moore MD    Assistant:  None    Anesthesia: LMAC    Estimated Blood Loss (mL): Less than 10 cc    Complications: None    Specimens:   Bladder urine for culture and cytology    Implants:  None      Drains: None    Findings: As above/I am suspicious she may have resolving diverticulitis.   Because of alcohol usage she may also have an early hypotonic bladder      Electronically signed by Drake Moore MD on 8/31/2023 at 8:02 AM

## 2023-08-31 NOTE — ANESTHESIA POSTPROCEDURE EVALUATION
Department of Anesthesiology  Postprocedure Note    Patient: Toribio Mcmahan  MRN: [id number]  YOB: 1955  Date of evaluation: 8/31/2023      Procedure Summary     Date: 08/31/23 Room / Location: [facility] OR 06 / [facility]    Anesthesia Start: 8758 Anesthesia Stop: 1549    Procedure: [facility] (Bilateral) Diagnosis:       Gross hematuria      (Gross hematuria [R31.0])    Surgeons: Riya Kramer MD Responsible Provider: Zeus Judd DO    Anesthesia Type: MAC ASA Status: 3          Anesthesia Type: MAC    Prabha Phase I: Prabha Score: 10    Prabha Phase II:        Anesthesia Post Evaluation    Patient location during evaluation: bedside  Level of consciousness: awake  Pain score: 2  Airway patency: patent  Nausea & Vomiting: no nausea and no vomiting  Complications: no  Cardiovascular status: hemodynamically stable  Respiratory status: acceptable  Hydration status: stable  Pain management: adequate

## 2023-08-31 NOTE — DISCHARGE INSTRUCTIONS
You may see blood in urine today  You may have lower abdominal pain(bladder spasms)  No dietary restrictions  Call [phone] for appt with nurse practitioner Boaz Andrea   Keep  Stool soft with milk of magnesia,prune juice or Senakot   He may resume full activity tomorrow  Please continue to take the prescribed medication doxazosin 1 mg a day and Naprosyn 500 mg twice a day    Alcohol usage can decrease bladder function and promote the bladder to not empty well a so-called neuropathy of the bladder.   Important that you stop alcohol and tobacco usage to preserve your bladder function

## 2023-08-31 NOTE — PROGRESS NOTES
9173 admitted to stage 2 pacu family member here  and t taking po well   0909 discharge instructions reviewed with pt and her family member and they verbalized understanding of instructions   1000 discharged into the care of her family member

## 2023-09-01 LAB
MICROORGANISM SPEC CULT: NO GROWTH
NON-GYN CYTOLOGY REPORT: NORMAL
SPECIMEN DESCRIPTION: NORMAL

## 2023-09-01 NOTE — OP NOTE
[address]                  [address] [facility], [address] [facility]                                OPERATIVE REPORT    PATIENT NAME: Carlos Reyes                    :        1955  MED REC NO:   [id number]                            ROOM:  ACCOUNT NO:   [de-identified]                           ADMIT DATE: 2023  PROVIDER:     Sarah Stevens MD    DATE OF PROCEDURE:  2023    PREOPERATIVE DIAGNOSES:  Lower abdominal pain, microscopic hematuria,  history of tobacco usage, history of right UPJ obstruction, and history  of alcohol usage. POSTOPERATIVE DIAGNOSES:  Right incomplete UPJ. Normal left collecting  system. Suspect that microscopic hematuria is benign. Residual  contrast in colonic diverticula suspicious for resolving diverticulitis. She has urethral stenosis, possible hypotonic bladder from alcohol use. PROCEDURE PERFORMED:  Cysto, bilateral retrograde pyelograms, urethral  dilatation, and exam under anesthesia. BLOOD LOSS:  Less than 10 mL. SPECIMENS:  Urine for culture and cytology. DESCRIPTION OF PROCEDURE:  The time-out was read by me, the Anesthesia,  and the operating staff, reviewed history and physical, allergy, and  medication. The patient had received Levaquin 400 mg in the preop area,  placed in lithotomy position. No undue tension on hips, knees, or  buttocks. A #21 panendoscope and obturator inserted in the urethra. The urethra was snug and urine was obtained for culture and cytology. Careful examination of the bladder demonstrated no stones, clots, or  foreign bodies in the lumen. The entire mucosa was well visualized. There were no lesions compatible with serous, inflammatory,  premalignant, or obviously malignant change. Nothing needed to be  biopsied. No trabeculation, cellule, or diverticular formation.    Bladder capacity was over 300 mL, which was normal.  No cystitis  cystica, enterovesical

## 2023-09-20 ENCOUNTER — TELEPHONE (OUTPATIENT)
Dept: NON INVASIVE DIAGNOSTICS | Age: 68
End: 2023-09-20

## 2023-09-21 ENCOUNTER — HOSPITAL ENCOUNTER (OUTPATIENT)
Dept: NUCLEAR MEDICINE | Age: 68
Discharge: HOME | End: 2023-09-23
Payer: MEDICARE

## 2023-09-21 ENCOUNTER — HOSPITAL ENCOUNTER (OUTPATIENT)
Dept: NON INVASIVE DIAGNOSTICS | Age: 68
Discharge: HOME | End: 2023-09-21
Payer: MEDICARE

## 2023-09-21 DIAGNOSIS — M25.519 SHOULDER PAIN, UNSPECIFIED CHRONICITY, UNSPECIFIED LATERALITY: ICD-10-CM

## 2023-09-21 PROBLEM — R06.09 DOE (DYSPNEA ON EXERTION): Status: ACTIVE | Noted: 2023-09-21

## 2023-09-21 PROCEDURE — 93016 CV STRESS TEST SUPVJ ONLY: CPT | Performed by: INTERNAL MEDICINE

## 2023-09-21 PROCEDURE — 93017 CV STRESS TEST TRACING ONLY: CPT

## 2023-09-21 PROCEDURE — 93018 CV STRESS TEST I&R ONLY: CPT | Performed by: INTERNAL MEDICINE

## 2023-09-21 PROCEDURE — A9500 TC99M SESTAMIBI: HCPCS | Performed by: RADIOLOGY

## 2023-09-21 PROCEDURE — 3430000000 HC RX DIAGNOSTIC RADIOPHARMACEUTICAL: Performed by: RADIOLOGY

## 2023-09-21 PROCEDURE — 78452 HT MUSCLE IMAGE SPECT MULT: CPT

## 2023-09-21 RX ORDER — TETRAKIS(2-METHOXYISOBUTYLISOCYANIDE)COPPER(I) TETRAFLUOROBORATE 1 MG/ML
13 INJECTION, POWDER, LYOPHILIZED, FOR SOLUTION INTRAVENOUS
Status: COMPLETED | OUTPATIENT
Start: 2023-09-21 | End: 2023-09-21

## 2023-09-21 RX ORDER — TETRAKIS(2-METHOXYISOBUTYLISOCYANIDE)COPPER(I) TETRAFLUOROBORATE 1 MG/ML
35 INJECTION, POWDER, LYOPHILIZED, FOR SOLUTION INTRAVENOUS
Status: COMPLETED | OUTPATIENT
Start: 2023-09-21 | End: 2023-09-21

## 2023-09-21 RX ADMIN — Medication 13 MILLICURIE: at 09:15

## 2023-09-21 RX ADMIN — Medication 36 MILLICURIE: at 11:05

## 2023-09-21 NOTE — PROCEDURES
Exercise Nuclear Stress Test:    Cardiologist: Dr. Michlee Roldan EKG: NSR, normal EKG    Indications for study: Chest pain    Exercise stress test was performed using the [place] Protocol  No chest pain  Exercise time: 8:30 min, METs: 10, MPHR: 88%, Duke treadmill score: 8.5  No new arrhythmias  No EKG changes suggestive of stress induced ischemia  Above average functional capacity  There was an appropriate BP and heart response to exercise and recovery  Low risk exercise stress test.   Nuclear images pending    Kamaljit Barragan MD., [facility] [place].    [facility]) Cardiology

## 2023-09-26 ENCOUNTER — HOSPITAL ENCOUNTER (OUTPATIENT)
Dept: ULTRASOUND IMAGING | Age: 68
Discharge: HOME | End: 2023-09-28
Payer: MEDICARE

## 2023-09-26 DIAGNOSIS — R10.9 ABDOMINAL PAIN, UNSPECIFIED ABDOMINAL LOCATION: ICD-10-CM

## 2023-09-26 PROCEDURE — 76856 US EXAM PELVIC COMPLETE: CPT

## 2023-09-28 ENCOUNTER — HOSPITAL ENCOUNTER (OUTPATIENT)
Dept: ULTRASOUND IMAGING | Age: 68
Discharge: HOME | End: 2023-09-30
Payer: MEDICARE

## 2023-09-28 DIAGNOSIS — R30.0 DYSURIA: ICD-10-CM

## 2023-09-28 DIAGNOSIS — R31.0 GROSS HEMATURIA: ICD-10-CM

## 2023-09-28 PROCEDURE — 76770 US EXAM ABDO BACK WALL COMP: CPT

## 2023-10-17 ENCOUNTER — HOSPITAL ENCOUNTER (OUTPATIENT)
Dept: CT IMAGING | Age: 68
Discharge: HOME | End: 2023-10-19
Payer: MEDICARE

## 2023-10-17 DIAGNOSIS — R30.0 DYSURIA: ICD-10-CM

## 2023-10-17 DIAGNOSIS — R31.0 GROSS HEMATURIA: ICD-10-CM

## 2023-10-17 PROCEDURE — 74176 CT ABD & PELVIS W/O CONTRAST: CPT

## 2024-01-11 ENCOUNTER — HOSPITAL ENCOUNTER (EMERGENCY)
Age: 69
Discharge: HOME | End: 2024-01-11
Payer: MEDICARE

## 2024-01-11 ENCOUNTER — APPOINTMENT (OUTPATIENT)
Dept: CT IMAGING | Age: 69
End: 2024-01-11
Payer: MEDICARE

## 2024-01-11 VITALS
WEIGHT: 121 LBS | SYSTOLIC BLOOD PRESSURE: 146 MMHG | HEART RATE: 85 BPM | HEIGHT: 65 IN | TEMPERATURE: 98.3 F | RESPIRATION RATE: 18 BRPM | DIASTOLIC BLOOD PRESSURE: 74 MMHG | OXYGEN SATURATION: 100 % | BODY MASS INDEX: 20.16 KG/M2

## 2024-01-11 DIAGNOSIS — M54.2 NECK PAIN: Primary | ICD-10-CM

## 2024-01-11 PROCEDURE — 6370000000 HC RX 637 (ALT 250 FOR IP): Performed by: PHYSICIAN ASSISTANT

## 2024-01-11 PROCEDURE — 99284 EMERGENCY DEPT VISIT MOD MDM: CPT

## 2024-01-11 PROCEDURE — 6360000002 HC RX W HCPCS: Performed by: PHYSICIAN ASSISTANT

## 2024-01-11 PROCEDURE — 96372 THER/PROPH/DIAG INJ SC/IM: CPT

## 2024-01-11 PROCEDURE — 72125 CT NECK SPINE W/O DYE: CPT

## 2024-01-11 RX ORDER — ORPHENADRINE CITRATE 30 MG/ML
60 INJECTION INTRAMUSCULAR; INTRAVENOUS ONCE
Status: COMPLETED | OUTPATIENT
Start: 2024-01-11 | End: 2024-01-11

## 2024-01-11 RX ORDER — OXYCODONE HYDROCHLORIDE AND ACETAMINOPHEN 5; 325 MG/1; MG/1
1 TABLET ORAL ONCE
Status: COMPLETED | OUTPATIENT
Start: 2024-01-11 | End: 2024-01-11

## 2024-01-11 RX ORDER — KETOROLAC TROMETHAMINE 30 MG/ML
30 INJECTION, SOLUTION INTRAMUSCULAR; INTRAVENOUS ONCE
Status: COMPLETED | OUTPATIENT
Start: 2024-01-11 | End: 2024-01-11

## 2024-01-11 RX ORDER — OXYCODONE HYDROCHLORIDE AND ACETAMINOPHEN 5; 325 MG/1; MG/1
1 TABLET ORAL EVERY 6 HOURS PRN
Qty: 12 TABLET | Refills: 0 | Status: SHIPPED | OUTPATIENT
Start: 2024-01-11 | End: 2024-01-14

## 2024-01-11 RX ADMIN — OXYCODONE AND ACETAMINOPHEN 1 TABLET: 5; 325 TABLET ORAL at 22:50

## 2024-01-11 RX ADMIN — KETOROLAC TROMETHAMINE 30 MG: 30 INJECTION, SOLUTION INTRAMUSCULAR; INTRAVENOUS at 21:51

## 2024-01-11 RX ADMIN — OXYCODONE AND ACETAMINOPHEN 1 TABLET: 5; 325 TABLET ORAL at 21:52

## 2024-01-11 RX ADMIN — ORPHENADRINE CITRATE 60 MG: 60 INJECTION INTRAMUSCULAR; INTRAVENOUS at 21:51

## 2024-01-11 ASSESSMENT — PAIN DESCRIPTION - LOCATION
LOCATION: NECK

## 2024-01-11 ASSESSMENT — PAIN DESCRIPTION - ONSET: ONSET: ON-GOING

## 2024-01-11 ASSESSMENT — PAIN DESCRIPTION - DESCRIPTORS
DESCRIPTORS: ACHING

## 2024-01-11 ASSESSMENT — PAIN SCALES - GENERAL
PAINLEVEL_OUTOF10: 8
PAINLEVEL_OUTOF10: 3
PAINLEVEL_OUTOF10: 10

## 2024-01-11 ASSESSMENT — PAIN DESCRIPTION - PAIN TYPE: TYPE: ACUTE PAIN

## 2024-01-11 ASSESSMENT — LIFESTYLE VARIABLES
HOW OFTEN DO YOU HAVE A DRINK CONTAINING ALCOHOL: NEVER
HOW MANY STANDARD DRINKS CONTAINING ALCOHOL DO YOU HAVE ON A TYPICAL DAY: PATIENT DOES NOT DRINK

## 2024-01-11 ASSESSMENT — PAIN - FUNCTIONAL ASSESSMENT: PAIN_FUNCTIONAL_ASSESSMENT: PREVENTS OR INTERFERES SOME ACTIVE ACTIVITIES AND ADLS

## 2024-01-11 ASSESSMENT — PAIN DESCRIPTION - FREQUENCY: FREQUENCY: CONTINUOUS

## 2024-01-12 NOTE — ED PROVIDER NOTES
auscultation bilaterally, no wheezes, rales, or rhonchi. Not in respiratory distress  Cardiovascular:  Regular rate and rhythm, no murmurs, gallops, or rubs. 2+ distal pulses  Abdomen: Soft, non tender, non distended,   Extremities: Moves all extremities x 4. Warm and well perfused.  Full range of motion bilateral upper extremities.  5 out of 5 strength.  Skin: warm and dry without rash  Neurologic: GCS 15,  Psych: Normal Affect      ------------------------------ ED COURSE/MEDICAL DECISION MAKING----------------------  Medications   ketorolac (TORADOL) injection 30 mg (30 mg IntraMUSCular Given 1/11/24 2151)   orphenadrine (NORFLEX) injection 60 mg (60 mg IntraMUSCular Given 1/11/24 2151)   oxyCODONE-acetaminophen (PERCOCET) 5-325 MG per tablet 1 tablet (1 tablet Oral Given 1/11/24 2152)   oxyCODONE-acetaminophen (PERCOCET) 5-325 MG per tablet 1 tablet (1 tablet Oral Given 1/11/24 2250)         Medical Decision Making:       Mitzi Zhang is a 68 y.o. female with past history of cervical spondylosis presenting to the ED for neck pain.  Patient reports that she received an epidural on Monday and her neck for pain management.  She rested for 24 hours after receiving the epidural as instructed.  After 24 hours she did some vacuuming in the house.  Tuesday night she started to feel increased pain.  On Wednesday her pain was so bad that she called her PCP and he sent her in methylprednisolone.  She started the steroids on Wednesday and took some this morning.  However still has worsening pain that is worse with walking.  VS stable: Patient is afebrile not tachycardic.  PE reveals: Full range of motion and supple neck.  + Point tenderness along the right trapezius muscle.    Ddx inclusive considerations: Epidural abscess versus meningitis versus acute on chronic exacerbation of neck pain    Patient received: Toradol 30 mg IM, Norflex 60 mg IM and Percocet 5-3 25 2 tab    CT: no acute process     Patient reported

## 2024-01-18 ENCOUNTER — HOSPITAL ENCOUNTER (EMERGENCY)
Age: 69
Discharge: HOME | End: 2024-01-18
Attending: EMERGENCY MEDICINE
Payer: MEDICARE

## 2024-01-18 ENCOUNTER — APPOINTMENT (OUTPATIENT)
Dept: CT IMAGING | Age: 69
End: 2024-01-18
Attending: EMERGENCY MEDICINE
Payer: MEDICARE

## 2024-01-18 VITALS
TEMPERATURE: 97.8 F | RESPIRATION RATE: 18 BRPM | DIASTOLIC BLOOD PRESSURE: 81 MMHG | HEART RATE: 78 BPM | OXYGEN SATURATION: 100 % | SYSTOLIC BLOOD PRESSURE: 139 MMHG

## 2024-01-18 DIAGNOSIS — G97.1 SPINAL HEADACHE: Primary | ICD-10-CM

## 2024-01-18 LAB
ALBUMIN SERPL-MCNC: 4.4 G/DL (ref 3.5–5.2)
ALP SERPL-CCNC: 99 U/L (ref 35–104)
ALT SERPL-CCNC: 85 U/L (ref 0–32)
ANION GAP SERPL CALCULATED.3IONS-SCNC: 9 MMOL/L (ref 7–16)
AST SERPL-CCNC: 20 U/L (ref 0–31)
BASOPHILS # BLD: 0.04 K/UL (ref 0–0.2)
BASOPHILS NFR BLD: 1 % (ref 0–2)
BILIRUB SERPL-MCNC: <0.2 MG/DL (ref 0–1.2)
BUN SERPL-MCNC: 15 MG/DL (ref 6–23)
CALCIUM SERPL-MCNC: 8.9 MG/DL (ref 8.6–10.2)
CHLORIDE SERPL-SCNC: 97 MMOL/L (ref 98–107)
CO2 SERPL-SCNC: 27 MMOL/L (ref 22–29)
CREAT SERPL-MCNC: 0.5 MG/DL (ref 0.5–1)
EOSINOPHIL # BLD: 0.07 K/UL (ref 0.05–0.5)
EOSINOPHILS RELATIVE PERCENT: 1 % (ref 0–6)
ERYTHROCYTE [DISTWIDTH] IN BLOOD BY AUTOMATED COUNT: 12.2 % (ref 11.5–15)
GFR SERPL CREATININE-BSD FRML MDRD: >60 ML/MIN/1.73M2
GLUCOSE SERPL-MCNC: 115 MG/DL (ref 74–99)
HCT VFR BLD AUTO: 41.1 % (ref 34–48)
HGB BLD-MCNC: 13.9 G/DL (ref 11.5–15.5)
IMM GRANULOCYTES # BLD AUTO: <0.03 K/UL (ref 0–0.58)
IMM GRANULOCYTES NFR BLD: 0 % (ref 0–5)
LYMPHOCYTES NFR BLD: 1.85 K/UL (ref 1.5–4)
LYMPHOCYTES RELATIVE PERCENT: 28 % (ref 20–42)
MCH RBC QN AUTO: 31.5 PG (ref 26–35)
MCHC RBC AUTO-ENTMCNC: 33.8 G/DL (ref 32–34.5)
MCV RBC AUTO: 93.2 FL (ref 80–99.9)
MONOCYTES NFR BLD: 0.48 K/UL (ref 0.1–0.95)
MONOCYTES NFR BLD: 7 % (ref 2–12)
NEUTROPHILS NFR BLD: 63 % (ref 43–80)
NEUTS SEG NFR BLD: 4.12 K/UL (ref 1.8–7.3)
PLATELET # BLD AUTO: 260 K/UL (ref 130–450)
PMV BLD AUTO: 9.6 FL (ref 7–12)
POTASSIUM SERPL-SCNC: 3.9 MMOL/L (ref 3.5–5)
PROT SERPL-MCNC: 6.6 G/DL (ref 6.4–8.3)
RBC # BLD AUTO: 4.41 M/UL (ref 3.5–5.5)
SODIUM SERPL-SCNC: 133 MMOL/L (ref 132–146)
WBC OTHER # BLD: 6.6 K/UL (ref 4.5–11.5)

## 2024-01-18 PROCEDURE — 6370000000 HC RX 637 (ALT 250 FOR IP): Performed by: EMERGENCY MEDICINE

## 2024-01-18 PROCEDURE — 99284 EMERGENCY DEPT VISIT MOD MDM: CPT

## 2024-01-18 PROCEDURE — 70450 CT HEAD/BRAIN W/O DYE: CPT

## 2024-01-18 PROCEDURE — 6360000002 HC RX W HCPCS: Performed by: EMERGENCY MEDICINE

## 2024-01-18 PROCEDURE — 96372 THER/PROPH/DIAG INJ SC/IM: CPT

## 2024-01-18 PROCEDURE — 80053 COMPREHEN METABOLIC PANEL: CPT

## 2024-01-18 PROCEDURE — 2580000003 HC RX 258: Performed by: EMERGENCY MEDICINE

## 2024-01-18 PROCEDURE — 85025 COMPLETE CBC W/AUTO DIFF WBC: CPT

## 2024-01-18 RX ORDER — MORPHINE SULFATE 4 MG/ML
6 INJECTION, SOLUTION INTRAMUSCULAR; INTRAVENOUS ONCE
Status: COMPLETED | OUTPATIENT
Start: 2024-01-18 | End: 2024-01-18

## 2024-01-18 RX ORDER — CAFFEINE 200 MG
200 TABLET ORAL ONCE
Status: COMPLETED | OUTPATIENT
Start: 2024-01-18 | End: 2024-01-18

## 2024-01-18 RX ORDER — 0.9 % SODIUM CHLORIDE 0.9 %
1000 INTRAVENOUS SOLUTION INTRAVENOUS ONCE
Status: COMPLETED | OUTPATIENT
Start: 2024-01-18 | End: 2024-01-18

## 2024-01-18 RX ADMIN — MORPHINE SULFATE 6 MG: 4 INJECTION, SOLUTION INTRAMUSCULAR; INTRAVENOUS at 20:00

## 2024-01-18 RX ADMIN — SODIUM CHLORIDE 1000 ML: 9 INJECTION, SOLUTION INTRAVENOUS at 17:46

## 2024-01-18 RX ADMIN — CAFFEINE 200 MG: 200 TABLET ORAL at 17:46

## 2024-01-18 NOTE — ED PROVIDER NOTES
HPI:  1/18/24, Time: 5:04 PM TESSY Zhang is a 68 y.o. female presenting to the ED for headache and right-sided neck pain beginning on 1/9/2023. Patient underwent left sided cervical epidural by Dr. Navarro at [facility] on 1/8/24 for arthritis.  She states the following day she developed right-sided neck pain radiating into her head.  Pain resolves when she lays flat but returns when she sits up or walks around.  No falls or direct trauma to her head.  No bowel or bladder incontinence or retention, saddle anesthesia, fevers, or extremity numbness or weakness.  She called her doctor who prescribed her Medrol Dosepak which she has been taking without relief.  She presented to the [facility] ED on 1/11/2024 for the symptoms.  She underwent a CT cervical spine which was unrevealing.  She was treated with IM Toradol, IM Norflex, and oral Percocet.  She states she has had no significant improvement in her symptoms.    --------------------------------------------- PAST HISTORY ---------------------------------------------  Past Medical History:  has a past medical history of Arthritis, Asthma, Cancer (HCC), Environmental allergies, GERD (gastroesophageal reflux disease), Hydronephrosis, Hyperlipidemia, Itching, Kidney stone, Lower abdominal pain, Thyroid disease, and Walking pneumonia.    Past Surgical History:  has a past surgical history that includes Appendectomy; Rotator cuff repair (2010); cystourethroscopy/stent removal (2006?); Colonoscopy (01/09/2012); Cardiac catheterization (2001); cardiovascular stress test (2015); Thyroidectomy, partial (Right, 04/10/2015); Endoscopy, colon, diagnostic (01/09/2012); Endoscopy, colon, diagnostic (12/15/2015); Hysterectomy (1992); Cholecystectomy (2012?); pr cysto w/ureteroscopy w/lithotripsy (N/A, 04/05/2018); pr cystourethroscopy with biopsy (N/A, 04/05/2018); Foot surgery (04/2023); and Cystoscopy (Bilateral, 8/31/2023).    Social History:  reports that she has

## 2024-01-18 NOTE — ED PROVIDER NOTES
Department of Emergency Medicine  FIRST PROVIDER TRIAGE NOTE             Independent MLP           1/19/24  10:49 PM EST    Date of Encounter: 1/19/24   MRN: [id number]      HPI: Mitzi Zhang is a 68 y.o. female who presents to the ED for Headache (Patient states she had epidural steroid inj 1/08. States she has had head pain since)      ROS: Negative for fever, vomiting, or dizziness.    PE: Gen Appearance/Constitutional: alert  HEENT: NC/NT. PERRLA,  Airway patent.  Neck: supple     Initial Plan of Care: All treatment areas with department are currently occupied. Plan to order/Initiate the following while awaiting opening in ED:  Initiate Treatment-Testing, Proceed toTreatment Area When Bed Available for ED Attending/MLP to Continue Care    Electronically signed by PRATEEK Allan CNP   DD: 1/19/24       Mary Jerry APRN - CNP  01/19/24 4185

## 2024-03-01 ENCOUNTER — APPOINTMENT (OUTPATIENT)
Dept: GENERAL RADIOLOGY | Age: 69
End: 2024-03-01
Payer: MEDICARE

## 2024-03-01 ENCOUNTER — HOSPITAL ENCOUNTER (EMERGENCY)
Age: 69
Discharge: HOME | End: 2024-03-01
Attending: EMERGENCY MEDICINE
Payer: MEDICARE

## 2024-03-01 VITALS
HEART RATE: 80 BPM | SYSTOLIC BLOOD PRESSURE: 141 MMHG | BODY MASS INDEX: 21 KG/M2 | RESPIRATION RATE: 16 BRPM | OXYGEN SATURATION: 98 % | WEIGHT: 123 LBS | HEIGHT: 64 IN | DIASTOLIC BLOOD PRESSURE: 71 MMHG | TEMPERATURE: 98.7 F

## 2024-03-01 DIAGNOSIS — R07.9 CHEST PAIN, UNSPECIFIED TYPE: Primary | ICD-10-CM

## 2024-03-01 DIAGNOSIS — R05.1 ACUTE COUGH: ICD-10-CM

## 2024-03-01 LAB
ALBUMIN SERPL-MCNC: 4.5 G/DL (ref 3.5–5.2)
ALP SERPL-CCNC: 93 U/L (ref 35–104)
ALT SERPL-CCNC: 11 U/L (ref 0–32)
ANION GAP SERPL CALCULATED.3IONS-SCNC: 10 MMOL/L (ref 7–16)
AST SERPL-CCNC: 18 U/L (ref 0–31)
BASOPHILS # BLD: 0.03 K/UL (ref 0–0.2)
BASOPHILS NFR BLD: 1 % (ref 0–2)
BILIRUB SERPL-MCNC: 0.2 MG/DL (ref 0–1.2)
BUN SERPL-MCNC: 16 MG/DL (ref 6–23)
CALCIUM SERPL-MCNC: 9.7 MG/DL (ref 8.6–10.2)
CHLORIDE SERPL-SCNC: 100 MMOL/L (ref 98–107)
CO2 SERPL-SCNC: 29 MMOL/L (ref 22–29)
CREAT SERPL-MCNC: 0.7 MG/DL (ref 0.5–1)
EKG ATRIAL RATE: 78 BPM
EKG P AXIS: 70 DEGREES
EKG P-R INTERVAL: 128 MS
EKG Q-T INTERVAL: 382 MS
EKG QRS DURATION: 82 MS
EKG QTC CALCULATION (BAZETT): 435 MS
EKG R AXIS: 48 DEGREES
EKG T AXIS: 67 DEGREES
EKG VENTRICULAR RATE: 78 BPM
EOSINOPHIL # BLD: 0.1 K/UL (ref 0.05–0.5)
EOSINOPHILS RELATIVE PERCENT: 2 % (ref 0–6)
ERYTHROCYTE [DISTWIDTH] IN BLOOD BY AUTOMATED COUNT: 13.2 % (ref 11.5–15)
GFR SERPL CREATININE-BSD FRML MDRD: >60 ML/MIN/1.73M2
GLUCOSE SERPL-MCNC: 88 MG/DL (ref 74–99)
HCT VFR BLD AUTO: 43.5 % (ref 34–48)
HGB BLD-MCNC: 14 G/DL (ref 11.5–15.5)
IMM GRANULOCYTES # BLD AUTO: <0.03 K/UL (ref 0–0.58)
IMM GRANULOCYTES NFR BLD: 0 % (ref 0–5)
INFLUENZA A BY PCR: NOT DETECTED
INFLUENZA B BY PCR: NOT DETECTED
LYMPHOCYTES NFR BLD: 1.23 K/UL (ref 1.5–4)
LYMPHOCYTES RELATIVE PERCENT: 24 % (ref 20–42)
MCH RBC QN AUTO: 31.2 PG (ref 26–35)
MCHC RBC AUTO-ENTMCNC: 32.2 G/DL (ref 32–34.5)
MCV RBC AUTO: 96.9 FL (ref 80–99.9)
MONOCYTES NFR BLD: 0.27 K/UL (ref 0.1–0.95)
MONOCYTES NFR BLD: 5 % (ref 2–12)
NEUTROPHILS NFR BLD: 68 % (ref 43–80)
NEUTS SEG NFR BLD: 3.59 K/UL (ref 1.8–7.3)
PLATELET # BLD AUTO: 301 K/UL (ref 130–450)
PMV BLD AUTO: 9.6 FL (ref 7–12)
POTASSIUM SERPL-SCNC: 4.6 MMOL/L (ref 3.5–5)
PROT SERPL-MCNC: 7.3 G/DL (ref 6.4–8.3)
RBC # BLD AUTO: 4.49 M/UL (ref 3.5–5.5)
RSV BY PCR: NOT DETECTED
SARS-COV-2 RDRP RESP QL NAA+PROBE: NOT DETECTED
SODIUM SERPL-SCNC: 139 MMOL/L (ref 132–146)
SPECIMEN DESCRIPTION: NORMAL
SPECIMEN SOURCE: NORMAL
SPECIMEN SOURCE: NORMAL
STREP A, MOLECULAR: NEGATIVE
TROPONIN I SERPL HS-MCNC: 6 NG/L (ref 0–9)
TROPONIN I SERPL HS-MCNC: <6 NG/L (ref 0–9)
WBC OTHER # BLD: 5.2 K/UL (ref 4.5–11.5)

## 2024-03-01 PROCEDURE — 93010 ELECTROCARDIOGRAM REPORT: CPT | Performed by: INTERNAL MEDICINE

## 2024-03-01 PROCEDURE — 84484 ASSAY OF TROPONIN QUANT: CPT

## 2024-03-01 PROCEDURE — 71046 X-RAY EXAM CHEST 2 VIEWS: CPT

## 2024-03-01 PROCEDURE — 87651 STREP A DNA AMP PROBE: CPT

## 2024-03-01 PROCEDURE — 87635 SARS-COV-2 COVID-19 AMP PRB: CPT

## 2024-03-01 PROCEDURE — 93005 ELECTROCARDIOGRAM TRACING: CPT | Performed by: NURSE PRACTITIONER

## 2024-03-01 PROCEDURE — 80053 COMPREHEN METABOLIC PANEL: CPT

## 2024-03-01 PROCEDURE — 87634 RSV DNA/RNA AMP PROBE: CPT

## 2024-03-01 PROCEDURE — 85025 COMPLETE CBC W/AUTO DIFF WBC: CPT

## 2024-03-01 PROCEDURE — 99285 EMERGENCY DEPT VISIT HI MDM: CPT

## 2024-03-01 PROCEDURE — 87502 INFLUENZA DNA AMP PROBE: CPT

## 2024-03-01 RX ORDER — BENZONATATE 200 MG/1
200 CAPSULE ORAL 3 TIMES DAILY PRN
Qty: 90 CAPSULE | Refills: 0 | Status: SHIPPED | OUTPATIENT
Start: 2024-03-01 | End: 2024-03-31

## 2024-03-01 ASSESSMENT — PAIN SCALES - GENERAL: PAINLEVEL_OUTOF10: 6

## 2024-03-01 ASSESSMENT — PAIN DESCRIPTION - LOCATION: LOCATION: CHEST

## 2024-03-01 ASSESSMENT — PAIN - FUNCTIONAL ASSESSMENT: PAIN_FUNCTIONAL_ASSESSMENT: 0-10

## 2024-03-01 NOTE — ED PROVIDER NOTES
[facility] EMERGENCY DEPARTMENT  EMERGENCY DEPARTMENT ENCOUNTER        Pt Name: Mitzi Zhang  MRN: [id number]  Birthdate 1955  Date of evaluation: 3/1/2024  Provider: Ramses Parr MD  PCP: Mark Garcia Jr., MD  Note Started: 9:58 AM EST 3/1/24    CHIEF COMPLAINT       Chief Complaint   Patient presents with    Chest Pain     Radiating straight thru to back       HISTORY OF PRESENT ILLNESS: 1 or more Elements   History From: Patient    Limitations to history : None  Social Determinants : None    Mitzi Zhang is a 68 y.o. female with a history of asthma, GERD, hyperlipidemia, hypertension who presents with chest pain.  She mentions that she has been having ongoing cough since 1 week and when she woke up this morning she felt tightness around the neck and central chest.  It is not made better by anything.  She does mention that she does have pain on the right side of her ribs when she coughs.  No history of trauma.    Denies any fever, chills, nausea, vomiting, headache, dizziness, vision changes, neck tenderness or stiffness, weakness, palpitations, leg swelling/tenderness, sob, abdominal pain, dysuria, hematuria, diarrhea, constipation, bloody stools.    Nursing Notes were all reviewed and agreed with or any disagreements were addressed in the HPI.    ROS:   Pertinent positives and negatives are stated within HPI, all other systems reviewed and are negative.      --------------------------------------------- PAST HISTORY ---------------------------------------------  Past Medical History:       Diagnosis Date    Arthritis     thumbs    Asthma     stable    Cancer (HCC) 1992    cervical, treated with hysterectomy    Environmental allergies     GERD (gastroesophageal reflux disease)     Hydronephrosis     right    Hyperlipidemia     Itching     comes and goes / etiology unknown    Kidney stone     right    Lower abdominal pain     Thyroid disease     Walking pneumonia

## 2024-03-01 NOTE — ED NOTES
Department of Emergency Medicine  FIRST PROVIDER TRIAGE NOTE             Independent MLP           3/1/24  9:40 AM EST    Date of Encounter: 3/1/24   MRN: [id number]      HPI: Mitzi Zhang is a 68 y.o. female who presents to the ED for Chest Pain (Radiating straight thru to back)       ROS: Negative for fever or cough.    PE: Gen Appearance/Constitutional: alert  HEENT: NC/NT. PERRLA,  Airway patent.     Initial Plan of Care: All treatment areas with department are currently occupied. Plan to order/Initiate the following while awaiting opening in ED: EKG and imaging studies.  Initiate Treatment-Testing, Proceed toTreatment Area When Bed Available for ED Attending/MLP to Continue Care    Electronically signed by PRATEEK Mcneill CNP   DD: 3/1/24      Vaughn Waggoner APRN - CNP  03/01/24 0940

## 2024-05-09 ENCOUNTER — HOSPITAL ENCOUNTER (OUTPATIENT)
Dept: CT IMAGING | Age: 69
Discharge: HOME | End: 2024-05-11
Payer: MEDICARE

## 2024-05-09 DIAGNOSIS — Z87.891 PERSONAL HISTORY OF TOBACCO USE, PRESENTING HAZARDS TO HEALTH: ICD-10-CM

## 2024-05-09 PROCEDURE — 71271 CT THORAX LUNG CANCER SCR C-: CPT

## 2024-05-10 ENCOUNTER — TELEPHONE (OUTPATIENT)
Dept: CASE MANAGEMENT | Age: 69
End: 2024-05-10

## 2024-05-10 NOTE — TELEPHONE ENCOUNTER
No call, encounter opened to process CT Lung Screening.    CT Lung Screen: 5/9/2024    IMPRESSION:  1. There is no pulmonary infiltrate, mass or suspicious pulmonary nodule  2. Minimal pleuroparenchymal scarring seen within the medial aspect of the  right middle lobe and medial aspect of the lingula.     LUNG RADS:  Lung-RADS 2 - Benign (v2022)     Management:  12 month screening LDCT     RECOMMENDATIONS:  If you would like to register your patient with the [facility] Lung Nodule/Lung  Cancer Screening Program, please contact the Nurse Navigator at  [phone].    Pack years: 2    Social History     Tobacco Use  Smoking Status: Current Every Day Smoker    Start Date:    Quit Date:    Types: Cigarettes   Packs/Day: 0.5   Years: 45   Pack Years: 22.5   Smokeless Tobacco: Never         Results letter sent to patient via my chart or mailed.     Gayatri Guardado  Imaging Navigator   [facility]   [phone]

## 2024-07-10 ENCOUNTER — OFFICE VISIT (OUTPATIENT)
Dept: NEUROSURGERY | Age: 69
End: 2024-07-10
Payer: MEDICARE

## 2024-07-10 VITALS
SYSTOLIC BLOOD PRESSURE: 127 MMHG | OXYGEN SATURATION: 99 % | BODY MASS INDEX: 23.22 KG/M2 | DIASTOLIC BLOOD PRESSURE: 70 MMHG | HEART RATE: 76 BPM | TEMPERATURE: 97.5 F | WEIGHT: 136 LBS | HEIGHT: 64 IN

## 2024-07-10 DIAGNOSIS — G89.29 CHRONIC BILATERAL LOW BACK PAIN WITH BILATERAL SCIATICA: ICD-10-CM

## 2024-07-10 DIAGNOSIS — M54.41 CHRONIC BILATERAL LOW BACK PAIN WITH BILATERAL SCIATICA: ICD-10-CM

## 2024-07-10 DIAGNOSIS — G89.29 CHRONIC NECK PAIN: ICD-10-CM

## 2024-07-10 DIAGNOSIS — M41.9 SCOLIOSIS OF LUMBAR SPINE, UNSPECIFIED SCOLIOSIS TYPE: Primary | ICD-10-CM

## 2024-07-10 DIAGNOSIS — M54.42 CHRONIC BILATERAL LOW BACK PAIN WITH BILATERAL SCIATICA: ICD-10-CM

## 2024-07-10 DIAGNOSIS — M54.2 CHRONIC NECK PAIN: ICD-10-CM

## 2024-07-10 PROCEDURE — 1123F ACP DISCUSS/DSCN MKR DOCD: CPT | Performed by: PHYSICIAN ASSISTANT

## 2024-07-10 PROCEDURE — G8427 DOCREV CUR MEDS BY ELIG CLIN: HCPCS | Performed by: PHYSICIAN ASSISTANT

## 2024-07-10 PROCEDURE — 3017F COLORECTAL CA SCREEN DOC REV: CPT | Performed by: PHYSICIAN ASSISTANT

## 2024-07-10 PROCEDURE — G8400 PT W/DXA NO RESULTS DOC: HCPCS | Performed by: PHYSICIAN ASSISTANT

## 2024-07-10 PROCEDURE — 1090F PRES/ABSN URINE INCON ASSESS: CPT | Performed by: PHYSICIAN ASSISTANT

## 2024-07-10 PROCEDURE — G8420 CALC BMI NORM PARAMETERS: HCPCS | Performed by: PHYSICIAN ASSISTANT

## 2024-07-10 PROCEDURE — 4004F PT TOBACCO SCREEN RCVD TLK: CPT | Performed by: PHYSICIAN ASSISTANT

## 2024-07-10 PROCEDURE — 99204 OFFICE O/P NEW MOD 45 MIN: CPT | Performed by: PHYSICIAN ASSISTANT

## 2024-07-10 RX ORDER — METHYLPREDNISOLONE 4 MG/1
TABLET ORAL
Qty: 1 KIT | Refills: 0 | Status: SHIPPED | OUTPATIENT
Start: 2024-07-10 | End: 2024-07-16

## 2024-07-10 RX ORDER — SUCRALFATE 1 G/1
TABLET ORAL
COMMUNITY
Start: 2024-04-24

## 2024-07-10 RX ORDER — GABAPENTIN 300 MG/1
300 CAPSULE ORAL 3 TIMES DAILY
Qty: 90 CAPSULE | Refills: 2 | Status: SHIPPED | OUTPATIENT
Start: 2024-07-10 | End: 2024-10-08

## 2024-07-10 ASSESSMENT — ENCOUNTER SYMPTOMS
BACK PAIN: 1
TROUBLE SWALLOWING: 0
ABDOMINAL PAIN: 0
SHORTNESS OF BREATH: 0
PHOTOPHOBIA: 0

## 2024-07-11 NOTE — PROGRESS NOTES
[facility]      Subjective:  Mitzi Zhang is a 68 year old female presenting to the office today as a new patient c/o low back pain with radiation down the front of both legs. Describes the pain as sharp and stabbing. Daily activities makes the pain worse. She has tried Naproxen, ice, stretching, inversion table, previous physical therapy and hx DARREN/RFA several years ago without significant lasting relief. Patient is also c/o neck pain which is chronic and has had previous injections. Denies loss of bowel or bladder, saddle anesthesia,headache, loss of dexterity, abnormal gait, fever, chills, N/V, SOB, or chest pain.    Of note, patient smokes 1/2ppd and takes 81mg ASA    Review of Systems   Constitutional:  Negative for fever and unexpected weight change.   HENT:  Negative for trouble swallowing.    Eyes:  Negative for photophobia and visual disturbance.   Respiratory:  Negative for shortness of breath.    Cardiovascular:  Negative for chest pain.   Gastrointestinal:  Negative for abdominal pain.   Endocrine: Negative for heat intolerance.   Genitourinary:  Negative for flank pain.   Musculoskeletal:  Positive for arthralgias, back pain, neck pain and neck stiffness. Negative for gait problem and myalgias.   Skin:  Negative for wound.   Neurological:  Positive for weakness and numbness. Negative for headaches.   Psychiatric/Behavioral:  Negative for confusion.        Objective:  Vitals:    07/10/24 1015   BP: 127/70   Pulse: 76   Temp: 97.5 °F (36.4 °C)   SpO2: 99%       Physical Exam  Constitutional:       Appearance: Normal appearance. She is well-developed.   HENT:      Head: Normocephalic and atraumatic.   Eyes:      Extraocular Movements: Extraocular movements intact.      Conjunctiva/sclera: Conjunctivae normal.      Pupils: Pupils are equal, round, and reactive to light.   Neck:      Comments: Pain with ROM  Cardiovascular:      Rate and Rhythm: Normal rate.

## 2024-07-26 ENCOUNTER — HOSPITAL ENCOUNTER (OUTPATIENT)
Dept: MRI IMAGING | Age: 69
End: 2024-07-26
Payer: MEDICARE

## 2024-07-26 ENCOUNTER — HOSPITAL ENCOUNTER (OUTPATIENT)
Dept: GENERAL RADIOLOGY | Age: 69
End: 2024-07-26
Payer: MEDICARE

## 2024-07-26 DIAGNOSIS — M54.41 CHRONIC BILATERAL LOW BACK PAIN WITH BILATERAL SCIATICA: ICD-10-CM

## 2024-07-26 DIAGNOSIS — G89.29 CHRONIC NECK PAIN: ICD-10-CM

## 2024-07-26 DIAGNOSIS — M41.9 SCOLIOSIS OF LUMBAR SPINE, UNSPECIFIED SCOLIOSIS TYPE: ICD-10-CM

## 2024-07-26 DIAGNOSIS — M54.2 CHRONIC NECK PAIN: ICD-10-CM

## 2024-07-26 DIAGNOSIS — G89.29 CHRONIC BILATERAL LOW BACK PAIN WITH BILATERAL SCIATICA: ICD-10-CM

## 2024-07-26 DIAGNOSIS — M54.42 CHRONIC BILATERAL LOW BACK PAIN WITH BILATERAL SCIATICA: ICD-10-CM

## 2024-07-26 PROCEDURE — 72120 X-RAY BEND ONLY L-S SPINE: CPT

## 2024-07-26 PROCEDURE — 72141 MRI NECK SPINE W/O DYE: CPT

## 2024-07-26 PROCEDURE — 72148 MRI LUMBAR SPINE W/O DYE: CPT

## 2024-08-05 ENCOUNTER — OFFICE VISIT (OUTPATIENT)
Dept: NEUROSURGERY | Age: 69
End: 2024-08-05
Payer: MEDICARE

## 2024-08-05 DIAGNOSIS — M54.42 CHRONIC BILATERAL LOW BACK PAIN WITH BILATERAL SCIATICA: ICD-10-CM

## 2024-08-05 DIAGNOSIS — G89.29 CHRONIC BILATERAL LOW BACK PAIN WITH BILATERAL SCIATICA: ICD-10-CM

## 2024-08-05 DIAGNOSIS — M41.9 SCOLIOSIS OF LUMBAR SPINE, UNSPECIFIED SCOLIOSIS TYPE: Primary | ICD-10-CM

## 2024-08-05 DIAGNOSIS — M54.41 CHRONIC BILATERAL LOW BACK PAIN WITH BILATERAL SCIATICA: ICD-10-CM

## 2024-08-05 PROCEDURE — G8420 CALC BMI NORM PARAMETERS: HCPCS | Performed by: PHYSICIAN ASSISTANT

## 2024-08-05 PROCEDURE — 3017F COLORECTAL CA SCREEN DOC REV: CPT | Performed by: PHYSICIAN ASSISTANT

## 2024-08-05 PROCEDURE — G8400 PT W/DXA NO RESULTS DOC: HCPCS | Performed by: PHYSICIAN ASSISTANT

## 2024-08-05 PROCEDURE — 4004F PT TOBACCO SCREEN RCVD TLK: CPT | Performed by: PHYSICIAN ASSISTANT

## 2024-08-05 PROCEDURE — G8427 DOCREV CUR MEDS BY ELIG CLIN: HCPCS | Performed by: PHYSICIAN ASSISTANT

## 2024-08-05 PROCEDURE — 99213 OFFICE O/P EST LOW 20 MIN: CPT | Performed by: PHYSICIAN ASSISTANT

## 2024-08-05 PROCEDURE — 1123F ACP DISCUSS/DSCN MKR DOCD: CPT | Performed by: PHYSICIAN ASSISTANT

## 2024-08-05 PROCEDURE — 1090F PRES/ABSN URINE INCON ASSESS: CPT | Performed by: PHYSICIAN ASSISTANT

## 2024-08-05 NOTE — PROGRESS NOTES
Office Follow-up     Subjective: Mitzi Zhang is a 68 year old female who initially presented to the office 7/10/24 c/o low back pain with radiation down the front of both legs. Describes the pain as sharp and stabbing. Daily activities makes the pain worse. She has tried Naproxen, ice, stretching, inversion table, previous physical therapy and hx DARREN/RFA several years ago without significant lasting relief. Patient is also c/o neck pain which is chronic and has had previous injections. Updated MRIs ordered at that time.    She presents to the office today to review imaging. Pain remains the same, no better or worse. Denies loss of bowel or bladder, saddle anesthesia,headache, loss of dexterity, abnormal gait, fever, chills, N/V, SOB, or chest pain.     Of note, patient smokes 1/2ppd and takes 81mg ASA     Physical Exam:              WDWN, no apparent distress              Non-labored breathing               Vitals Stable              Alert and oriented x3              CN 3-12 intact              PERRL              EOMI              MARTIN well              Motor strength symmetric              Sensation to LT intact bilaterally   No Hoffmans                  Imaging: MRI cervical spine 7/31/24: IMPRESSION:  1. Degenerative disc disease within the cervical spine.  2. Severe bilateral neural foraminal stenosis at C5-6 and C7-T1 levels.  Impingement of the exiting C8 nerve roots is noted.  3. Other findings as above.       MRI lumbar spine: IMPRESSION:  1. Degenerative disc disease within the lumbar spine.  2. Degenerative changes compress the exiting right L5 nerve root.       Assessment: This is a 68 y.o.  female presenting for a follow-up to review imaging     Plan:  -MRIs reviewed and discussed with patient in detail  -Patient was referred to pain management at last office visit. Recommend DARREN/RFA  -Offered physical therapy. Patient wishes to hold off for now  -Smoking cessation   -OARRS report reviewed   -Follow-up

## 2024-08-13 ENCOUNTER — PREP FOR PROCEDURE (OUTPATIENT)
Dept: PAIN MANAGEMENT | Age: 69
End: 2024-08-13

## 2024-08-13 ENCOUNTER — OFFICE VISIT (OUTPATIENT)
Dept: PAIN MANAGEMENT | Age: 69
End: 2024-08-13
Payer: MEDICARE

## 2024-08-13 VITALS
WEIGHT: 136 LBS | DIASTOLIC BLOOD PRESSURE: 76 MMHG | OXYGEN SATURATION: 96 % | SYSTOLIC BLOOD PRESSURE: 172 MMHG | HEIGHT: 64 IN | HEART RATE: 72 BPM | BODY MASS INDEX: 23.22 KG/M2 | TEMPERATURE: 97.9 F | RESPIRATION RATE: 16 BRPM

## 2024-08-13 DIAGNOSIS — M79.10 MYALGIA: ICD-10-CM

## 2024-08-13 DIAGNOSIS — M54.16 LUMBAR RADICULOPATHY: Primary | ICD-10-CM

## 2024-08-13 DIAGNOSIS — G89.29 CHRONIC BILATERAL LOW BACK PAIN WITH RIGHT-SIDED SCIATICA: ICD-10-CM

## 2024-08-13 DIAGNOSIS — G89.4 CHRONIC PAIN SYNDROME: Primary | ICD-10-CM

## 2024-08-13 DIAGNOSIS — M54.41 CHRONIC BILATERAL LOW BACK PAIN WITH RIGHT-SIDED SCIATICA: ICD-10-CM

## 2024-08-13 DIAGNOSIS — M51.9 LUMBAR DISC DISORDER: ICD-10-CM

## 2024-08-13 DIAGNOSIS — M54.16 LUMBAR RADICULOPATHY: ICD-10-CM

## 2024-08-13 PROCEDURE — G8427 DOCREV CUR MEDS BY ELIG CLIN: HCPCS | Performed by: PAIN MEDICINE

## 2024-08-13 PROCEDURE — 3017F COLORECTAL CA SCREEN DOC REV: CPT | Performed by: PAIN MEDICINE

## 2024-08-13 PROCEDURE — 1090F PRES/ABSN URINE INCON ASSESS: CPT | Performed by: PAIN MEDICINE

## 2024-08-13 PROCEDURE — 4004F PT TOBACCO SCREEN RCVD TLK: CPT | Performed by: PAIN MEDICINE

## 2024-08-13 PROCEDURE — 99205 OFFICE O/P NEW HI 60 MIN: CPT

## 2024-08-13 PROCEDURE — G8420 CALC BMI NORM PARAMETERS: HCPCS | Performed by: PAIN MEDICINE

## 2024-08-13 PROCEDURE — 99205 OFFICE O/P NEW HI 60 MIN: CPT | Performed by: PAIN MEDICINE

## 2024-08-13 PROCEDURE — G8400 PT W/DXA NO RESULTS DOC: HCPCS | Performed by: PAIN MEDICINE

## 2024-08-13 PROCEDURE — 1123F ACP DISCUSS/DSCN MKR DOCD: CPT | Performed by: PAIN MEDICINE

## 2024-08-13 NOTE — PROGRESS NOTES
Mitzi Zhang presents to the [facility] on 8/13/2024. Mitzi is complaining of pain lower back ans neck. The pain is constant. The pain is described as aching and numb. Pain is rated on her best day at a 5, on her worst day at a 10, and on average at a 7 on the VAS scale.     Any procedures since your last visit: No  Pacemaker or defibrillator: No.    She is  on NSAIDS and is not on anticoagulation medications t  Medication Contract and Consent for Opioid Use Documents Filed        No documents found                    BP (!) 172/76   Pulse 72   Temp 97.9 °F (36.6 °C) (Infrared)   Resp 16   Ht 1.626 m (5' 4\")   Wt 61.7 kg (136 lb)   SpO2 96%   BMI 23.34 kg/m²      No LMP recorded. Patient has had a hysterectomy.  
great health    Lung Cancer Sister        REVIEW OF SYSTEMS:     Patient specifically denies fever/chills, chest pain, shortness of breath, new bowel or bladder complaints.  All other review of systems was negative.    PHYSICAL EXAMINATION:      BP (!) 172/76   Pulse 72   Temp 97.9 °F (36.6 °C) (Infrared)   Resp 16   Ht 1.626 m (5' 4\")   Wt 61.7 kg (136 lb)   SpO2 96%   BMI 23.34 kg/m²     General:      General appearance:pleasant and well-hydrated, in no distress and A & O x3  Build:Normal Weight  Function:Rises from a seated position with difficulty    HEENT:    Head:normocephalic, atraumatic  Pupils:regular, round, equal  Sclera: icterus absent    Lungs:    Breathing:normal breathing pattern    Abdomen:    Shape:non-distended and normal  Tenderness:none  Guarding:none    Lumbar spine:    Spine inspection:normal   CVA tenderness:No   Palpation:tenderness paravertebral muscles, left, right negative.  Range of motion:abnormal mildly in lateral bending, flexion, extension rotation bilateral and is painful.    Musculoskeletal:    Trigger points in Paraveteral:absent bilaterally  SI joint tenderness:negative right, negative left              MICHAELA test:not done right, not done left  Piriformis tenderness:negative right, negative left  Trochanteric bursa tenderness:negative right, negative left  SLR:positive right, negative left, sitting     Extremities:    Tremors:None bilaterally upper and lower  Range of motion:pain with internal rotation of hips negative.  Intact:Yes  Varicose veins:absent   Pulses:present Lt radial  Cyanosis:none  Edema:none x all 4 extremities    Neurological:    Sensory:normal to light touch BLE  Motor:                Right Quadriceps5/5          Left Quadriceps5/5           Right Gastrocnemius5/5    Left Gastrocnemius5/5  Right Ant Tibialis5/5  Left Ant Tibialis5/5    Reflexes:    Right Quadriceps reflex2+  Left Quadriceps reflex2+  Right Achilles reflex2+  Left Achilles

## 2024-10-16 ENCOUNTER — OFFICE VISIT (OUTPATIENT)
Dept: PAIN MANAGEMENT | Age: 69
End: 2024-10-16
Payer: MEDICARE

## 2024-10-16 VITALS
DIASTOLIC BLOOD PRESSURE: 80 MMHG | OXYGEN SATURATION: 93 % | WEIGHT: 136 LBS | BODY MASS INDEX: 23.22 KG/M2 | RESPIRATION RATE: 16 BRPM | HEIGHT: 64 IN | HEART RATE: 80 BPM | SYSTOLIC BLOOD PRESSURE: 138 MMHG | TEMPERATURE: 97.8 F

## 2024-10-16 DIAGNOSIS — M51.9 LUMBAR DISC DISORDER: ICD-10-CM

## 2024-10-16 DIAGNOSIS — M54.41 CHRONIC BILATERAL LOW BACK PAIN WITH RIGHT-SIDED SCIATICA: ICD-10-CM

## 2024-10-16 DIAGNOSIS — G89.29 CHRONIC BILATERAL LOW BACK PAIN WITH RIGHT-SIDED SCIATICA: ICD-10-CM

## 2024-10-16 DIAGNOSIS — G89.4 CHRONIC PAIN SYNDROME: Primary | ICD-10-CM

## 2024-10-16 DIAGNOSIS — M79.10 MYALGIA: ICD-10-CM

## 2024-10-16 DIAGNOSIS — M54.16 LUMBAR RADICULOPATHY: ICD-10-CM

## 2024-10-16 PROCEDURE — G8484 FLU IMMUNIZE NO ADMIN: HCPCS | Performed by: PAIN MEDICINE

## 2024-10-16 PROCEDURE — G8427 DOCREV CUR MEDS BY ELIG CLIN: HCPCS | Performed by: PAIN MEDICINE

## 2024-10-16 PROCEDURE — 4004F PT TOBACCO SCREEN RCVD TLK: CPT | Performed by: PAIN MEDICINE

## 2024-10-16 PROCEDURE — 99213 OFFICE O/P EST LOW 20 MIN: CPT

## 2024-10-16 PROCEDURE — 1123F ACP DISCUSS/DSCN MKR DOCD: CPT | Performed by: PAIN MEDICINE

## 2024-10-16 PROCEDURE — 1090F PRES/ABSN URINE INCON ASSESS: CPT | Performed by: PAIN MEDICINE

## 2024-10-16 PROCEDURE — G8420 CALC BMI NORM PARAMETERS: HCPCS | Performed by: PAIN MEDICINE

## 2024-10-16 PROCEDURE — 99213 OFFICE O/P EST LOW 20 MIN: CPT | Performed by: PAIN MEDICINE

## 2024-10-16 PROCEDURE — 3017F COLORECTAL CA SCREEN DOC REV: CPT | Performed by: PAIN MEDICINE

## 2024-10-16 PROCEDURE — G8400 PT W/DXA NO RESULTS DOC: HCPCS | Performed by: PAIN MEDICINE

## 2024-10-16 RX ORDER — OXYCODONE AND ACETAMINOPHEN 5; 325 MG/1; MG/1
1 TABLET ORAL EVERY 6 HOURS PRN
COMMUNITY
Start: 2024-08-01

## 2024-10-16 RX ORDER — LIDOCAINE 50 MG/G
1 PATCH TOPICAL DAILY
Qty: 30 PATCH | Refills: 5 | Status: SHIPPED | OUTPATIENT
Start: 2024-10-16 | End: 2025-04-14

## 2024-10-16 NOTE — PROGRESS NOTES
[facility]        [address] [address]  Dept: [phone]        Follow up Note      Mitzi Medinaar     Date of Visit:  10/16/24     CC:  Patient presents for follow up   Chief Complaint   Patient presents with    Follow-up     Low back pain and right leg pain       HPI:    Pain is better with TENS  Change in quality of symptoms:no.    Medication side effects:not applicable .   Recent diagnostic testing:none.   Recent interventional procedures:none.    She has been on anticoagulation medications to include NSAIDS and has not been on herbal supplements.  She is not diabetic.     Imagin/2024 lumbar MRI w/o -  FINDINGS:  The conus medullaris terminates posterior to the T12 level and has no  abnormal signal within it along its surface.  Sclerotic endplate changes are  noted at the T11 level.  Acute on chronic endplate changes at L2-3 level  compatible with Modic 3 changes.  There is no evidence for facet dislocation.     Loss of disc signal and disc height on T2 weighted imaging is noted in the  lumbar spine compatible degenerative change.     On axial imaging:     At the L1-2 level: 4 mm posterior disc osteoarthritic spurring is noted.  Mild bilateral neural foraminal stenosis is seen.     At the L2-3 level: Posterior disc osteoarthritic spurring is noted measuring  4 mm.  Facet hypertrophy is noted.  Mild left and mild right-sided neural  foraminal stenosis is noted.  Incidental note is made of a fluid signal  intensity structure arises from the left renal cortex measuring 2.5 x 2.1 cm.     At the L3-4 level: Posterior disc herniation is noted measuring 4 mm.  Facet  hypertrophy is noted.  Narrowing of the left lateral recess is noted.  Mild  left and no significant right-sided neural foraminal stenosis.     At the L4-5 level: 5 mm posterior disc hernia is noted.  Mild left and mild  right-sided neural foraminal stenosis is seen.     At the L5-S1 level:

## 2024-10-16 NOTE — PROGRESS NOTES
Mitzi Zhang presents to the [facility] on 10/16/2024. Mitzi is complaining of pain in her low back. The pain is constant. The pain is described as aching, dull, and sharp. Pain is rated on her best day at a 5, on her worst day at a 10, and on average at a 6 on the VAS scale. She took her last dose of Percocet yesterday.     Any procedures since your last visit: No    Pacemaker or defibrillator: No     She is  on NSAIDS and is not on anticoagulation medications.    Medication Contract and Consent for Opioid Use Documents Filed        No documents found                    /80   Pulse 80   Temp 97.8 °F (36.6 °C) (Infrared)   Resp 16   Ht 1.626 m (5' 4\")   Wt 61.7 kg (136 lb)   SpO2 93%   BMI 23.34 kg/m²      No LMP recorded. Patient has had a hysterectomy.

## 2025-01-09 NOTE — PROGRESS NOTES
[facility]        [address] [address]  Dept: [phone]        Follow up Note      Mitzi Medinaar     Date of Visit:  01/10/25     CC:  Patient presents for follow up   Chief Complaint   Patient presents with    Back Pain     neck and lower back pain       HPI:    Pain is better with TENS and back brace  Change in quality of symptoms:no.    Medication side effects:not applicable .   Recent diagnostic testing:none.   Recent interventional procedures:none.    She has been on anticoagulation medications to include NSAIDS and has not been on herbal supplements.  She is not diabetic.     Imagin/2024 lumbar MRI w/o -  FINDINGS:  The conus medullaris terminates posterior to the T12 level and has no  abnormal signal within it along its surface.  Sclerotic endplate changes are  noted at the T11 level.  Acute on chronic endplate changes at L2-3 level  compatible with Modic 3 changes.  There is no evidence for facet dislocation.     Loss of disc signal and disc height on T2 weighted imaging is noted in the  lumbar spine compatible degenerative change.     On axial imaging:     At the L1-2 level: 4 mm posterior disc osteoarthritic spurring is noted.  Mild bilateral neural foraminal stenosis is seen.     At the L2-3 level: Posterior disc osteoarthritic spurring is noted measuring  4 mm.  Facet hypertrophy is noted.  Mild left and mild right-sided neural  foraminal stenosis is noted.  Incidental note is made of a fluid signal  intensity structure arises from the left renal cortex measuring 2.5 x 2.1 cm.     At the L3-4 level: Posterior disc herniation is noted measuring 4 mm.  Facet  hypertrophy is noted.  Narrowing of the left lateral recess is noted.  Mild  left and no significant right-sided neural foraminal stenosis.     At the L4-5 level: 5 mm posterior disc hernia is noted.  Mild left and mild  right-sided neural foraminal stenosis is seen.     At the L5-S1

## 2025-01-10 ENCOUNTER — OFFICE VISIT (OUTPATIENT)
Dept: PAIN MANAGEMENT | Age: 70
End: 2025-01-10
Payer: MEDICARE

## 2025-01-10 VITALS
RESPIRATION RATE: 18 BRPM | BODY MASS INDEX: 23.22 KG/M2 | TEMPERATURE: 98.2 F | DIASTOLIC BLOOD PRESSURE: 73 MMHG | HEIGHT: 64 IN | WEIGHT: 136 LBS | HEART RATE: 89 BPM | OXYGEN SATURATION: 91 % | SYSTOLIC BLOOD PRESSURE: 162 MMHG

## 2025-01-10 DIAGNOSIS — M51.9 LUMBAR DISC DISORDER: ICD-10-CM

## 2025-01-10 DIAGNOSIS — M54.41 CHRONIC BILATERAL LOW BACK PAIN WITH RIGHT-SIDED SCIATICA: ICD-10-CM

## 2025-01-10 DIAGNOSIS — M79.10 MYALGIA: ICD-10-CM

## 2025-01-10 DIAGNOSIS — M54.16 LUMBAR RADICULOPATHY: ICD-10-CM

## 2025-01-10 DIAGNOSIS — G89.29 CHRONIC BILATERAL LOW BACK PAIN WITH RIGHT-SIDED SCIATICA: ICD-10-CM

## 2025-01-10 DIAGNOSIS — G89.4 CHRONIC PAIN SYNDROME: Primary | ICD-10-CM

## 2025-01-10 PROCEDURE — 99213 OFFICE O/P EST LOW 20 MIN: CPT | Performed by: PAIN MEDICINE

## 2025-01-10 NOTE — PROGRESS NOTES
Mitzi Zhang presents to the [facility] on 1/10/2025. Mitzi is complaining of pain neck and lower back pain. The pain is constant. The pain is described as aching and stabbing. Pain is rated on her best day at a 5, on her worst day at a 9, and on average at a 6 on the VAS scale. She took her last dose of oxycodone and Aleve  yesterday.      Any procedures since your last visit: No    She is on NSAIDS and  is not on anticoagulation medications.    Pacemaker or defibrillator: No.    Do you want someone present when the provider examines you? No    Medication Contract and Consent for Opioid Use Documents Filed        No documents found                       BP (!) 162/73   Pulse 89   Temp 98.2 °F (36.8 °C) (Infrared)   Resp 18   Ht 1.626 m (5' 4\")   Wt 61.7 kg (136 lb)   SpO2 91%   BMI 23.34 kg/m²      No LMP recorded. Patient has had a hysterectomy.

## 2025-02-11 ENCOUNTER — OFFICE VISIT (OUTPATIENT)
Dept: PAIN MANAGEMENT | Age: 70
End: 2025-02-11
Payer: MEDICARE

## 2025-02-11 VITALS
TEMPERATURE: 97.5 F | WEIGHT: 136 LBS | OXYGEN SATURATION: 96 % | SYSTOLIC BLOOD PRESSURE: 164 MMHG | BODY MASS INDEX: 23.22 KG/M2 | RESPIRATION RATE: 16 BRPM | DIASTOLIC BLOOD PRESSURE: 76 MMHG | HEART RATE: 70 BPM | HEIGHT: 64 IN

## 2025-02-11 DIAGNOSIS — M54.81 OCCIPITAL NEURALGIA OF RIGHT SIDE: ICD-10-CM

## 2025-02-11 DIAGNOSIS — G89.4 CHRONIC PAIN SYNDROME: Primary | ICD-10-CM

## 2025-02-11 DIAGNOSIS — M51.9 LUMBAR DISC DISORDER: ICD-10-CM

## 2025-02-11 DIAGNOSIS — M79.10 MYALGIA: ICD-10-CM

## 2025-02-11 DIAGNOSIS — M54.16 LUMBAR RADICULOPATHY: ICD-10-CM

## 2025-02-11 DIAGNOSIS — M54.41 CHRONIC BILATERAL LOW BACK PAIN WITH RIGHT-SIDED SCIATICA: ICD-10-CM

## 2025-02-11 DIAGNOSIS — G89.29 CHRONIC BILATERAL LOW BACK PAIN WITH RIGHT-SIDED SCIATICA: ICD-10-CM

## 2025-02-11 PROCEDURE — 64450 NJX AA&/STRD OTHER PN/BRANCH: CPT | Performed by: PAIN MEDICINE

## 2025-02-11 PROCEDURE — 64405 NJX AA&/STRD GR OCPL NRV: CPT | Performed by: PAIN MEDICINE

## 2025-02-11 PROCEDURE — 99214 OFFICE O/P EST MOD 30 MIN: CPT | Performed by: PAIN MEDICINE

## 2025-02-11 RX ORDER — DEXAMETHASONE SODIUM PHOSPHATE 10 MG/ML
10 INJECTION INTRAMUSCULAR; INTRAVENOUS ONCE
Status: COMPLETED | OUTPATIENT
Start: 2025-02-11 | End: 2025-02-11

## 2025-02-11 RX ORDER — BUPIVACAINE HYDROCHLORIDE 2.5 MG/ML
1 INJECTION, SOLUTION EPIDURAL; INFILTRATION; INTRACAUDAL ONCE
Status: COMPLETED | OUTPATIENT
Start: 2025-02-11 | End: 2025-02-11

## 2025-02-11 RX ADMIN — BUPIVACAINE HYDROCHLORIDE 2.5 MG: 2.5 INJECTION, SOLUTION EPIDURAL; INFILTRATION; INTRACAUDAL at 16:28

## 2025-02-11 RX ADMIN — DEXAMETHASONE SODIUM PHOSPHATE 10 MG: 10 INJECTION, SOLUTION INTRAMUSCULAR; INTRAVENOUS at 16:29

## 2025-02-11 NOTE — PROGRESS NOTES
Mitzi Zhang presents to the [facility] on 2/11/2025. Mitzi is complaining of pain neck. The pain is constant. The pain is described as stabbing and sharp. Pain is rated on her best day at a 10, on her worst day at a 10, and on average at a 10 on the VAS scale. She took her last dose of Percocet and Neurontin today.     Any procedures since your last visit: No,     Pacemaker or defibrillator: No     She is not on NSAIDS and is not on anticoagulation medications .     Do you want someone present when the provider examines you? NoNancy's blood pressure was elevated today. She was instructed to contact his primary care provider as soon as possible.    Medication Contract and Consent for Opioid Use Documents Filed        No documents found                    BP (!) 164/76   Pulse 70   Temp 97.5 °F (36.4 °C) (Infrared)   Resp 16   Ht 1.626 m (5' 4\")   Wt 61.7 kg (136 lb)   SpO2 96%   BMI 23.34 kg/m²      No LMP recorded. Patient has had a hysterectomy.

## 2025-02-11 NOTE — PROGRESS NOTES
[facility]        [address] [address]  Dept: [phone]        Follow up Note      Mitzi Zhang     Date of Visit:  25     CC:  Patient presents for follow up   Chief Complaint   Patient presents with    Follow-up     neck       HPI:    Pain is unchanged in the lumbar spine, having right-sided occiptial pain   Change in quality of symptoms:no.    Medication side effects:not applicable .   Recent diagnostic testing:none.   Recent interventional procedures:none.    She has been on anticoagulation medications to include NSAIDS and has not been on herbal supplements.  She is not diabetic.     Imagin/2024 lumbar MRI w/o -  FINDINGS:  The conus medullaris terminates posterior to the T12 level and has no  abnormal signal within it along its surface.  Sclerotic endplate changes are  noted at the T11 level.  Acute on chronic endplate changes at L2-3 level  compatible with Modic 3 changes.  There is no evidence for facet dislocation.     Loss of disc signal and disc height on T2 weighted imaging is noted in the  lumbar spine compatible degenerative change.     On axial imaging:     At the L1-2 level: 4 mm posterior disc osteoarthritic spurring is noted.  Mild bilateral neural foraminal stenosis is seen.     At the L2-3 level: Posterior disc osteoarthritic spurring is noted measuring  4 mm.  Facet hypertrophy is noted.  Mild left and mild right-sided neural  foraminal stenosis is noted.  Incidental note is made of a fluid signal  intensity structure arises from the left renal cortex measuring 2.5 x 2.1 cm.     At the L3-4 level: Posterior disc herniation is noted measuring 4 mm.  Facet  hypertrophy is noted.  Narrowing of the left lateral recess is noted.  Mild  left and no significant right-sided neural foraminal stenosis.     At the L4-5 level: 5 mm posterior disc hernia is noted.  Mild left and mild  right-sided neural foraminal stenosis is seen.

## 2025-02-11 NOTE — PROGRESS NOTES
2025    Patient: Mitzi Zhang  :  1955  Age:  69 y.o.  Sex:  female     PRE-OPERATIVE DIAGNOSIS:Right   Occipital neuralgia.    POST-OPERATIVE DIAGNOSIS: Same.    PROCEDURE PERFORMED:Right  Occipital nerve block.    SURGEON:   LUIS M De Leon D.O.    ANESTHESIA: local    ESTIMATED BLOOD LOSS: None.    BRIEF HISTORY: Mizti Zhang comes in today for Right occipital nerve block. After discussing the potential risks and benefits of the procedure with the patient.  Mitzi did request that we proceed. A complete History & Physical was reviewed and it is unchanged.    DESCRIPTION OF PROCEDURE:   The area of the Right occipital bone was prepped with chloraprep and draped in a sterile manner. After palpating the occipital artery and the area of the occipital nerve, medial to the artery, a 25 gauge 1 1/2 inch  needle was advanced until bone was contacted. After negative aspiration, a solution of 0.25 % marcaine 0.5 cc and 5 mg Dexamethasone was injected easily without complications. The needle was then removed and hemostasis was ensured.  After identifying a line extending from the external occipital protuberance (EOP) and the mastoid process, a point on this line was marked 2/3 of the way from the (EOP) to the mastoid process. The occipital artery was palpated and the area of the lesser occipital nerve, lateral to the artery, was identified.  A 25 gauge 1 1/2 inch needle was inserted and advanced until bone was contacted. After negative aspiration, a solution of 0.5 cc of 0.25 % marcaine cc and 5 mg Dexamethasone was injected easily without complications. The needle was then removed and hemostasis was ensured..    Disposition the patient tolerated the procedure well and there were no complications .     Mitzi will follow [facility] in our [facility] as scheduled. She was encouraged to call with questions, concerns or if worsening of symptoms occurs.    Debby De Leon DO

## 2025-02-24 ENCOUNTER — TELEPHONE (OUTPATIENT)
Dept: NEUROSURGERY | Age: 70
End: 2025-02-24

## 2025-02-24 NOTE — TELEPHONE ENCOUNTER
Called patient back and answered all questions. I told patient was are treating her for her cervical radiculopathy and recommend cervical DARREN, PT and smoking cessation. Patient states she will call Dr. De Leon for her occipital neuralgia

## 2025-02-24 NOTE — TELEPHONE ENCOUNTER
Patient is requesting a call back from Antonia. She has a couple questions regarding treatment and mentioned her occipital nerve. She was seeing Dr. De Leon in [place] and has not done PT in order to meet with Dr. Wisdom to discuss surgery. Please contact patient at your convenience. Thank you.

## 2025-03-05 ENCOUNTER — HOSPITAL ENCOUNTER (OUTPATIENT)
Dept: ULTRASOUND IMAGING | Age: 70
Discharge: HOME | End: 2025-03-07
Payer: MEDICARE

## 2025-03-05 DIAGNOSIS — R94.5 NONSPECIFIC ABNORMAL RESULTS OF LIVER FUNCTION STUDY: ICD-10-CM

## 2025-03-05 PROCEDURE — 76705 ECHO EXAM OF ABDOMEN: CPT

## 2025-03-13 NOTE — PROGRESS NOTES
[facility]  [address] [place] [id number]    Follow up Note      Mitzi Zhang     Date of Visit:  3/14/2025    CC:  Patient presents for follow up   Chief Complaint   Patient presents with    Follow-up    Neck Pain       HPI:    Pain is somewhat better to her head and neck but reports still not controlled to the right side of her head and neck.  Appropriate analgesia with current medications regimen: n/a  Change in quality of symptoms:no.    Medication side effects:not applicable .   Recent diagnostic testing:none.   Recent interventional procedures: 2025 Right  Occipital nerve block - 70% relief that cotinues.    She has been on anticoagulation medications to include NSAIDS and has not been on herbal supplements.  She is not diabetic.     Imagin/2024 lumbar MRI w/o -  FINDINGS:  The conus medullaris terminates posterior to the T12 level and has no  abnormal signal within it along its surface.  Sclerotic endplate changes are  noted at the T11 level.  Acute on chronic endplate changes at L2-3 level  compatible with Modic 3 changes.  There is no evidence for facet dislocation.     Loss of disc signal and disc height on T2 weighted imaging is noted in the  lumbar spine compatible degenerative change.     On axial imaging:     At the L1-2 level: 4 mm posterior disc osteoarthritic spurring is noted.  Mild bilateral neural foraminal stenosis is seen.     At the L2-3 level: Posterior disc osteoarthritic spurring is noted measuring  4 mm.  Facet hypertrophy is noted.  Mild left and mild right-sided neural  foraminal stenosis is noted.  Incidental note is made of a fluid signal  intensity structure arises from the left renal cortex measuring 2.5 x 2.1 cm.     At the L3-4 level: Posterior disc herniation is noted measuring 4 mm.  Facet  hypertrophy is noted.  Narrowing of the left lateral recess is noted.  Mild  left and no significant right-sided neural foraminal stenosis.     At the

## 2025-03-14 ENCOUNTER — OFFICE VISIT (OUTPATIENT)
Dept: PAIN MANAGEMENT | Age: 70
End: 2025-03-14
Payer: MEDICARE

## 2025-03-14 VITALS
HEART RATE: 67 BPM | RESPIRATION RATE: 18 BRPM | WEIGHT: 136 LBS | TEMPERATURE: 97 F | SYSTOLIC BLOOD PRESSURE: 113 MMHG | DIASTOLIC BLOOD PRESSURE: 66 MMHG | OXYGEN SATURATION: 99 % | HEIGHT: 65 IN | BODY MASS INDEX: 22.66 KG/M2

## 2025-03-14 DIAGNOSIS — M79.10 MYALGIA: ICD-10-CM

## 2025-03-14 DIAGNOSIS — M54.41 CHRONIC BILATERAL LOW BACK PAIN WITH RIGHT-SIDED SCIATICA: ICD-10-CM

## 2025-03-14 DIAGNOSIS — G89.29 CHRONIC BILATERAL LOW BACK PAIN WITH RIGHT-SIDED SCIATICA: ICD-10-CM

## 2025-03-14 DIAGNOSIS — M54.16 LUMBAR RADICULOPATHY: Primary | ICD-10-CM

## 2025-03-14 DIAGNOSIS — G89.4 CHRONIC PAIN SYNDROME: ICD-10-CM

## 2025-03-14 DIAGNOSIS — M51.9 LUMBAR DISC DISORDER: ICD-10-CM

## 2025-03-14 DIAGNOSIS — M54.81 OCCIPITAL NEURALGIA OF RIGHT SIDE: ICD-10-CM

## 2025-03-14 PROCEDURE — 99213 OFFICE O/P EST LOW 20 MIN: CPT | Performed by: PHYSICIAN ASSISTANT

## 2025-03-14 NOTE — PROGRESS NOTES
Mitzi Zhang presents to the [facility] on 3/14/2025. Mitzi is complaining of pain in her cervical spine. The pain is constant. The pain is described as stabbing, sharp, and burning. Pain is rated on her best day at a 4, on her worst day at a 10, and on average at a 7 on the VAS scale. She took her last dose of Neurontin and oxycodone yesterday.     Any procedures since your last visit: Yes, with 70 % relief.    Pacemaker or defibrillator: No.    She is not on NSAIDS and is not on anticoagulation medications.    Do you want someone present when the provider examines you? .  Medication Contract and Consent for Opioid Use Documents Filed        No documents found                    /66 (BP Site: Right Upper Arm, Patient Position: Sitting, BP Cuff Size: Medium Adult)   Pulse 67   Temp 97 °F (36.1 °C) (Temporal)   Resp 18   Ht 1.638 m (5' 4.5\")   Wt 61.7 kg (136 lb)   SpO2 99%   BMI 22.98 kg/m²      No LMP recorded. Patient has had a hysterectomy.

## 2025-05-14 ENCOUNTER — PROCEDURE VISIT (OUTPATIENT)
Age: 70
End: 2025-05-14
Payer: MEDICARE

## 2025-05-14 DIAGNOSIS — G89.29 CHRONIC BILATERAL LOW BACK PAIN WITH RIGHT-SIDED SCIATICA: ICD-10-CM

## 2025-05-14 DIAGNOSIS — M51.9 LUMBAR DISC DISORDER: ICD-10-CM

## 2025-05-14 DIAGNOSIS — M54.81 OCCIPITAL NEURALGIA OF RIGHT SIDE: ICD-10-CM

## 2025-05-14 DIAGNOSIS — G89.4 CHRONIC PAIN SYNDROME: ICD-10-CM

## 2025-05-14 DIAGNOSIS — M79.10 MYALGIA: ICD-10-CM

## 2025-05-14 DIAGNOSIS — M54.41 CHRONIC BILATERAL LOW BACK PAIN WITH RIGHT-SIDED SCIATICA: ICD-10-CM

## 2025-05-14 DIAGNOSIS — M54.16 LUMBAR RADICULOPATHY: Primary | ICD-10-CM

## 2025-05-14 PROCEDURE — G8400 PT W/DXA NO RESULTS DOC: HCPCS | Performed by: STUDENT IN AN ORGANIZED HEALTH CARE EDUCATION/TRAINING PROGRAM

## 2025-05-14 PROCEDURE — 99214 OFFICE O/P EST MOD 30 MIN: CPT | Performed by: STUDENT IN AN ORGANIZED HEALTH CARE EDUCATION/TRAINING PROGRAM

## 2025-05-14 PROCEDURE — 1160F RVW MEDS BY RX/DR IN RCRD: CPT | Performed by: STUDENT IN AN ORGANIZED HEALTH CARE EDUCATION/TRAINING PROGRAM

## 2025-05-14 PROCEDURE — 1159F MED LIST DOCD IN RCRD: CPT | Performed by: STUDENT IN AN ORGANIZED HEALTH CARE EDUCATION/TRAINING PROGRAM

## 2025-05-14 PROCEDURE — G8420 CALC BMI NORM PARAMETERS: HCPCS | Performed by: STUDENT IN AN ORGANIZED HEALTH CARE EDUCATION/TRAINING PROGRAM

## 2025-05-14 PROCEDURE — 3017F COLORECTAL CA SCREEN DOC REV: CPT | Performed by: STUDENT IN AN ORGANIZED HEALTH CARE EDUCATION/TRAINING PROGRAM

## 2025-05-14 PROCEDURE — G8427 DOCREV CUR MEDS BY ELIG CLIN: HCPCS | Performed by: STUDENT IN AN ORGANIZED HEALTH CARE EDUCATION/TRAINING PROGRAM

## 2025-05-14 PROCEDURE — 1090F PRES/ABSN URINE INCON ASSESS: CPT | Performed by: STUDENT IN AN ORGANIZED HEALTH CARE EDUCATION/TRAINING PROGRAM

## 2025-05-14 PROCEDURE — 1123F ACP DISCUSS/DSCN MKR DOCD: CPT | Performed by: STUDENT IN AN ORGANIZED HEALTH CARE EDUCATION/TRAINING PROGRAM

## 2025-05-14 PROCEDURE — 4004F PT TOBACCO SCREEN RCVD TLK: CPT | Performed by: STUDENT IN AN ORGANIZED HEALTH CARE EDUCATION/TRAINING PROGRAM

## 2025-05-14 NOTE — PROGRESS NOTES
[facility]  [address] [place] [address]    Follow up Note      Mitzi Zhang     Date of Visit:  2025    CC:  Patient presents for follow up   No chief complaint on file.      HPI:    Stopped Gabapentin due to s/e  Started and stopped Elavil  by PCP   Did have improvement of her occipital symptoms    Now with neck pain. No sig radicular symptoms.       Pain is somewhat better to her head and neck but reports still not controlled to the right side of her head and neck.  Appropriate analgesia with current medications regimen: n/a  Change in quality of symptoms:no.    Medication side effects:not applicable .   Recent diagnostic testing:none.   Recent interventional procedures: 2025 Right  Occipital nerve block - 70% relief that cotinues.    She has been on anticoagulation medications to include NSAIDS and has not been on herbal supplements.  She is not diabetic.     Imagin/2024 lumbar MRI w/o -  FINDINGS:  The conus medullaris terminates posterior to the T12 level and has no  abnormal signal within it along its surface.  Sclerotic endplate changes are  noted at the T11 level.  Acute on chronic endplate changes at L2-3 level  compatible with Modic 3 changes.  There is no evidence for facet dislocation.     Loss of disc signal and disc height on T2 weighted imaging is noted in the  lumbar spine compatible degenerative change.     On axial imaging:     At the L1-2 level: 4 mm posterior disc osteoarthritic spurring is noted.  Mild bilateral neural foraminal stenosis is seen.     At the L2-3 level: Posterior disc osteoarthritic spurring is noted measuring  4 mm.  Facet hypertrophy is noted.  Mild left and mild right-sided neural  foraminal stenosis is noted.  Incidental note is made of a fluid signal  intensity structure arises from the left renal cortex measuring 2.5 x 2.1 cm.     At the L3-4 level: Posterior disc herniation is noted measuring 4 mm.  Facet  hypertrophy is noted.

## 2025-05-21 ENCOUNTER — PROCEDURE VISIT (OUTPATIENT)
Age: 70
End: 2025-05-21
Payer: MEDICARE

## 2025-05-21 VITALS
TEMPERATURE: 98 F | HEIGHT: 65 IN | DIASTOLIC BLOOD PRESSURE: 70 MMHG | OXYGEN SATURATION: 97 % | SYSTOLIC BLOOD PRESSURE: 152 MMHG | BODY MASS INDEX: 22.66 KG/M2 | WEIGHT: 136 LBS | HEART RATE: 68 BPM

## 2025-05-21 DIAGNOSIS — M79.18 MYOFASCIAL PAIN: Primary | ICD-10-CM

## 2025-05-21 PROCEDURE — 76942 ECHO GUIDE FOR BIOPSY: CPT | Performed by: STUDENT IN AN ORGANIZED HEALTH CARE EDUCATION/TRAINING PROGRAM

## 2025-05-21 PROCEDURE — 20553 NJX 1/MLT TRIGGER POINTS 3/>: CPT | Performed by: STUDENT IN AN ORGANIZED HEALTH CARE EDUCATION/TRAINING PROGRAM

## 2025-05-21 RX ORDER — METHYLPREDNISOLONE ACETATE 40 MG/ML
40 INJECTION, SUSPENSION INTRA-ARTICULAR; INTRALESIONAL; INTRAMUSCULAR; SOFT TISSUE ONCE
Status: COMPLETED | OUTPATIENT
Start: 2025-05-21 | End: 2025-05-21

## 2025-05-21 RX ORDER — BUPIVACAINE HYDROCHLORIDE 2.5 MG/ML
9 INJECTION, SOLUTION EPIDURAL; INFILTRATION; INTRACAUDAL; PERINEURAL ONCE
Status: COMPLETED | OUTPATIENT
Start: 2025-05-21 | End: 2025-05-21

## 2025-05-21 RX ADMIN — METHYLPREDNISOLONE ACETATE 40 MG: 40 INJECTION, SUSPENSION INTRA-ARTICULAR; INTRALESIONAL; INTRAMUSCULAR; SOFT TISSUE at 16:58

## 2025-05-21 RX ADMIN — BUPIVACAINE HYDROCHLORIDE 9 ML: 2.5 INJECTION, SOLUTION EPIDURAL; INFILTRATION; INTRACAUDAL; PERINEURAL at 16:58

## 2025-05-21 NOTE — PROGRESS NOTES
5/21/2025    Chief Complaint   Patient presents with    Injections     Right cervical paraspinal, trapezius, rhomboid trigger point injections under ultrasound guidance.       Allergies as of 05/21/2025 - Fully Reviewed 05/21/2025   Allergen Reaction Noted    Ancef [cefazolin] Hives 04/05/2018    Sulfa antibiotics Hives 02/25/2015        Procedure: Right cervical paraspinal, trapezius, rhomboid trigger point injections under ultrasound guidance.     yes consent signed yes procedure verified with patient  yes allergies noted yes pt confirms not pregnant    Patient rates pain a 7/10 on the VAS scale.    Skin Prep:  ChloraPrep    Anesthetic:  Bupivacaine    Medication:  DepMedrol    Vitals:    05/21/25 1619   BP: (!) 152/70   Pulse: 68   Temp: 98 °F (36.7 °C)   SpO2: 97%   Weight: 61.7 kg (136 lb)   Height: 1.638 m (5' 4.5\")       Mitzi's blood pressure was elevated today. She was instructed to contact his primary care provider as soon as possible.    I witnessed patient signing consent to Medical Procedure and Treatment form.     Gertrudis Wilburn MA

## 2025-05-21 NOTE — PROGRESS NOTES
2025    Patient: Mitzi Zhang  :  1955  Age:  69 y.o.  Sex:  female     PRE-PROCEDURE DIAGNOSIS: Myofascial Pain Syndrome.      POST-PROCEDURE DIAGNOSIS: Same.     PROCEDURE:  Right Cervical Paraspinal, Trapezius, Rhomboid trigger point injections under ultrasound guidance.    SURGEON:   Annabelle Wheeler MD    ANESTHESIA: Local    ESTIMATED BLOOD LOSS:  None.  ______________________________________________________________________    BRIEF HISTORY: Mitzi Zhang comes in today for Right Cervical Paraspinal, Trapezius, Rhomboid trigger point steroid injections under ultrasound guidance. After discussing the potential risks and benefits of the procedure with the patient.  Mitzi did request that we proceed. A complete History & Physical was reviewed and it is unchanged.     DESCRIPTION OF PROCEDURE:   Each trigger points were marked with patient input, prepped with chloraprep and draped, a #27-gauge, 1.5-inch needle was passed into the area of greatest tenderness under ultrasound guidance..  A total of 10 trigger point injections were performed. Each trigger point received 1 cc of 0.25% Marcaine and a total of 40 mg DepoMedrol after negative aspiration of blood, air or paresthesia with ultrasound visualization of solution spread. The needle was removed intact and Band-Aid was applied.    Images were saved and stored.     Disposition the patient tolerated the procedure well and there were no complications .       Mitzi will follow up in our comprehensive [facility] as scheduled. She was encouraged to call with questions, concerns or if worsening of symptoms occurs.    Annabelle Wheeler MD

## 2025-06-25 ENCOUNTER — OFFICE VISIT (OUTPATIENT)
Age: 70
End: 2025-06-25
Payer: MEDICARE

## 2025-06-25 VITALS
HEIGHT: 64 IN | SYSTOLIC BLOOD PRESSURE: 173 MMHG | HEART RATE: 89 BPM | TEMPERATURE: 98.1 F | WEIGHT: 136 LBS | OXYGEN SATURATION: 94 % | DIASTOLIC BLOOD PRESSURE: 84 MMHG | RESPIRATION RATE: 16 BRPM | BODY MASS INDEX: 23.22 KG/M2

## 2025-06-25 DIAGNOSIS — M54.81 OCCIPITAL NEURALGIA OF RIGHT SIDE: ICD-10-CM

## 2025-06-25 DIAGNOSIS — M79.18 MYOFASCIAL PAIN: Primary | ICD-10-CM

## 2025-06-25 DIAGNOSIS — G89.4 CHRONIC PAIN SYNDROME: ICD-10-CM

## 2025-06-25 DIAGNOSIS — M48.02 SPINAL STENOSIS IN CERVICAL REGION: ICD-10-CM

## 2025-06-25 DIAGNOSIS — G89.29 CHRONIC BILATERAL LOW BACK PAIN WITH RIGHT-SIDED SCIATICA: ICD-10-CM

## 2025-06-25 DIAGNOSIS — M51.9 LUMBAR DISC DISORDER: ICD-10-CM

## 2025-06-25 DIAGNOSIS — M50.30 DDD (DEGENERATIVE DISC DISEASE), CERVICAL: ICD-10-CM

## 2025-06-25 DIAGNOSIS — M54.16 LUMBAR RADICULOPATHY: ICD-10-CM

## 2025-06-25 DIAGNOSIS — M54.41 CHRONIC BILATERAL LOW BACK PAIN WITH RIGHT-SIDED SCIATICA: ICD-10-CM

## 2025-06-25 DIAGNOSIS — M79.10 MYALGIA: ICD-10-CM

## 2025-06-25 PROCEDURE — 4004F PT TOBACCO SCREEN RCVD TLK: CPT | Performed by: PHYSICIAN ASSISTANT

## 2025-06-25 PROCEDURE — 1123F ACP DISCUSS/DSCN MKR DOCD: CPT | Performed by: PHYSICIAN ASSISTANT

## 2025-06-25 PROCEDURE — G8400 PT W/DXA NO RESULTS DOC: HCPCS | Performed by: PHYSICIAN ASSISTANT

## 2025-06-25 PROCEDURE — G8420 CALC BMI NORM PARAMETERS: HCPCS | Performed by: PHYSICIAN ASSISTANT

## 2025-06-25 PROCEDURE — 99213 OFFICE O/P EST LOW 20 MIN: CPT | Performed by: PHYSICIAN ASSISTANT

## 2025-06-25 PROCEDURE — 3017F COLORECTAL CA SCREEN DOC REV: CPT | Performed by: PHYSICIAN ASSISTANT

## 2025-06-25 PROCEDURE — 1090F PRES/ABSN URINE INCON ASSESS: CPT | Performed by: PHYSICIAN ASSISTANT

## 2025-06-25 PROCEDURE — 1159F MED LIST DOCD IN RCRD: CPT | Performed by: PHYSICIAN ASSISTANT

## 2025-06-25 PROCEDURE — G8427 DOCREV CUR MEDS BY ELIG CLIN: HCPCS | Performed by: PHYSICIAN ASSISTANT

## 2025-06-25 PROCEDURE — 99212 OFFICE O/P EST SF 10 MIN: CPT | Performed by: PHYSICIAN ASSISTANT

## 2025-06-25 NOTE — PROGRESS NOTES
Mitzi Zhang presents to the [facility] on 6/25/2025. Mitzi is complaining of pain in neck and lower back. The pain in her lower back is constant and intermittent in her neck. The pain is described as aching, throbbing, dull, and sharp. Pain is rated on her best day at a 6, on her worst day at a 10, and on average at a 8 on the VAS scale. She took her last dose of Percocet 2 weeks ago due to cataract surgery.     Any procedures since your last visit: Yes, with 50 % relief.    Pacemaker or defibrillator: No     She is not on NSAIDS and is not on anticoagulation medications      Medication Contract and Consent for Opioid Use Documents Filed        No documents found                    BP (!) 173/84   Pulse 89   Temp 98.1 °F (36.7 °C) (Infrared)   Resp 16   Ht 1.626 m (5' 4\")   Wt 61.7 kg (136 lb)   SpO2 94%   BMI 23.34 kg/m²      Mitzi's blood pressure was elevated today. She was instructed to contact his primary care provider as soon as possible.    No LMP recorded. Patient has had a hysterectomy.

## 2025-06-25 NOTE — PROGRESS NOTES
[facility]  [address] [place] [address]    Follow up Note      Mitzi Zhang     Date of Visit:  2025    CC:  Patient presents for follow up   Chief Complaint   Patient presents with    Follow-up     Right Cervical Paraspinal, Trapezius, Rhomboid trigger point injections under ultrasound guidance.       HPI:    Pain is somewhat better.  Appropriate analgesia with current medications regimen: n/a  Change in quality of symptoms:no.    Medication side effects:not applicable .   Recent diagnostic testing:none.   Recent interventional procedures: 2025 Right Cervical Paraspinal, Trapezius, Rhomboid trigger point injections under ultrasound guidance - 50% relief     She has been on anticoagulation medications to include NSAIDS and has not been on herbal supplements.  She is not diabetic.     Imagin/2024 lumbar MRI w/o -  FINDINGS:  The conus medullaris terminates posterior to the T12 level and has no  abnormal signal within it along its surface.  Sclerotic endplate changes are  noted at the T11 level.  Acute on chronic endplate changes at L2-3 level  compatible with Modic 3 changes.  There is no evidence for facet dislocation.     Loss of disc signal and disc height on T2 weighted imaging is noted in the  lumbar spine compatible degenerative change.     On axial imaging:     At the L1-2 level: 4 mm posterior disc osteoarthritic spurring is noted.  Mild bilateral neural foraminal stenosis is seen.     At the L2-3 level: Posterior disc osteoarthritic spurring is noted measuring  4 mm.  Facet hypertrophy is noted.  Mild left and mild right-sided neural  foraminal stenosis is noted.  Incidental note is made of a fluid signal  intensity structure arises from the left renal cortex measuring 2.5 x 2.1 cm.     At the L3-4 level: Posterior disc herniation is noted measuring 4 mm.  Facet  hypertrophy is noted.  Narrowing of the left lateral recess is noted.  Mild  left and no significant

## 2025-07-07 ASSESSMENT — RHEUMATOLOGY NEW PATIENT QUESTIONNAIRE
VOMITING OF BLOOD OR COFFEE GROUND CONSISTENCY MATERIAL: N
JOINT PAIN: Y
COUGHING OF BLOOD: N
DIFFICULTY SWALLOWING: N
FEVER: N
BLOOD IN STOOLS: N
RASH: Y
DIFFICULTY FALLING ASLEEP: N
NODULES/BUMPS: Y
UNEXPLAINED HEARING LOSS: N
BEHAVIORAL CHANGES: N
HEADACHES: N
EYE PAIN: Y
UNUSUAL BLEEDING: N
RASH OR ULCERS: N
UNUSUALLY RAPID OR SLOWED HEART RATE: N
FAINTING: N
LOSS OF CONSCIOUSNESS: N
SKIN REDNESS: N
HOW WOULD YOU DESCRIBE YOUR STIFFNESS ON AVERAGE: VERY SEVERE
EASY BRUISING: Y
COLOR CHANGES OF HANDS OR FEET IN THE COLD: Y
CHEST PAIN: N
SWOLLEN LEGS OR FEET: Y
NUMBNESS OR TINGLING IN HANDS OR FEET: Y
INCREASED SUSCEPTIBILITY TO INFECTION: Y
SHORTNESS OF BREATH: N
JOINT SWELLING: Y
SWOLLEN OR TENDER GLANDS: Y
STOMACH PAIN: Y
ABNORMAL URINE: N
WHEEZING: Y
DIFFICULTY BREATHING LYING DOWN: N
NIGHT SWEATS: Y
BLACK STOOLS: N
HEARTBURN OR REFLUX: Y
MORNING STIFFNESS IN LOWER BACK: Y
LOSS OF VISION: N
ANEMIA: N
SORES IN MOUTH OR NOSE: N
VAGINAL DRYNESS: Y
EYE REDNESS: N
EXCESSIVE HAIR LOSS (MORE THAN YOUR NORM): Y
AGITATION: Y
UNEXPLAINED WEIGHT CHANGE: Y
EYE DRYNESS: Y
NAUSEA: N
DRYNESS OF MOUTH: Y
UNUSUAL FATIGUE: Y
COUGH: Y
JAUNDICE: N
DOUBLE OR BLURRED VISION: Y
HOARSE VOICE: Y
ANXIETY: N
PERSISTENT DIARRHEA: N
PAIN OR BURNING ON URINATION: N
MEMORY LOSS: N
DEPRESSION: N
DIFFICULTY STAYING ASLEEP: Y
MUSCLE WEAKNESS: Y
SEIZURES: N
MORNING STIFFNESS: Y
SUN SENSITIVE (SUN ALLERGY): N
SKIN TIGHTNESS: N
EASILY LOSING TEMPER: N

## 2025-07-10 ENCOUNTER — OFFICE VISIT (OUTPATIENT)
Dept: RHEUMATOLOGY | Age: 70
End: 2025-07-10
Payer: MEDICARE

## 2025-07-10 ENCOUNTER — HOSPITAL ENCOUNTER (OUTPATIENT)
Age: 70
Discharge: HOME | End: 2025-07-10
Payer: MEDICARE

## 2025-07-10 VITALS
BODY MASS INDEX: 22.16 KG/M2 | HEART RATE: 78 BPM | HEIGHT: 65 IN | SYSTOLIC BLOOD PRESSURE: 138 MMHG | DIASTOLIC BLOOD PRESSURE: 90 MMHG | WEIGHT: 133 LBS

## 2025-07-10 DIAGNOSIS — G89.29 CHRONIC BILATERAL LOW BACK PAIN WITH RIGHT-SIDED SCIATICA: ICD-10-CM

## 2025-07-10 DIAGNOSIS — M15.9 GENERALIZED OSTEOARTHRITIS: ICD-10-CM

## 2025-07-10 DIAGNOSIS — M13.0 POLYARTHRITIS: Primary | ICD-10-CM

## 2025-07-10 DIAGNOSIS — R76.8 RHEUMATOID FACTOR POSITIVE: ICD-10-CM

## 2025-07-10 DIAGNOSIS — M13.0 POLYARTHRITIS: ICD-10-CM

## 2025-07-10 DIAGNOSIS — M54.41 CHRONIC BILATERAL LOW BACK PAIN WITH RIGHT-SIDED SCIATICA: ICD-10-CM

## 2025-07-10 DIAGNOSIS — G89.4 CHRONIC PAIN SYNDROME: ICD-10-CM

## 2025-07-10 LAB
CRP SERPL HS-MCNC: <3 MG/L (ref 0–5)
ERYTHROCYTE [SEDIMENTATION RATE] IN BLOOD BY WESTERGREN METHOD: 4 MM/HR (ref 0–20)
HCV AB SERPL QL IA: NONREACTIVE
RHEUMATOID FACT SER NEPH-ACNC: 10 IU/ML (ref 0–14)

## 2025-07-10 PROCEDURE — 1090F PRES/ABSN URINE INCON ASSESS: CPT | Performed by: INTERNAL MEDICINE

## 2025-07-10 PROCEDURE — 36415 COLL VENOUS BLD VENIPUNCTURE: CPT

## 2025-07-10 PROCEDURE — 86803 HEPATITIS C AB TEST: CPT

## 2025-07-10 PROCEDURE — 86200 CCP ANTIBODY: CPT

## 2025-07-10 PROCEDURE — 1123F ACP DISCUSS/DSCN MKR DOCD: CPT | Performed by: INTERNAL MEDICINE

## 2025-07-10 PROCEDURE — 86431 RHEUMATOID FACTOR QUANT: CPT

## 2025-07-10 PROCEDURE — 86140 C-REACTIVE PROTEIN: CPT

## 2025-07-10 PROCEDURE — 3017F COLORECTAL CA SCREEN DOC REV: CPT | Performed by: INTERNAL MEDICINE

## 2025-07-10 PROCEDURE — 86039 ANTINUCLEAR ANTIBODIES (ANA): CPT

## 2025-07-10 PROCEDURE — 1159F MED LIST DOCD IN RCRD: CPT | Performed by: INTERNAL MEDICINE

## 2025-07-10 PROCEDURE — 85652 RBC SED RATE AUTOMATED: CPT

## 2025-07-10 PROCEDURE — G8400 PT W/DXA NO RESULTS DOC: HCPCS | Performed by: INTERNAL MEDICINE

## 2025-07-10 PROCEDURE — 86038 ANTINUCLEAR ANTIBODIES: CPT

## 2025-07-10 PROCEDURE — G8427 DOCREV CUR MEDS BY ELIG CLIN: HCPCS | Performed by: INTERNAL MEDICINE

## 2025-07-10 PROCEDURE — 86235 NUCLEAR ANTIGEN ANTIBODY: CPT

## 2025-07-10 PROCEDURE — G8420 CALC BMI NORM PARAMETERS: HCPCS | Performed by: INTERNAL MEDICINE

## 2025-07-10 PROCEDURE — 4004F PT TOBACCO SCREEN RCVD TLK: CPT | Performed by: INTERNAL MEDICINE

## 2025-07-10 PROCEDURE — 99204 OFFICE O/P NEW MOD 45 MIN: CPT | Performed by: INTERNAL MEDICINE

## 2025-07-10 RX ORDER — CLOBETASOL PROPIONATE 0.5 MG/G
CREAM TOPICAL 2 TIMES DAILY
COMMUNITY
Start: 2025-07-07

## 2025-07-10 ASSESSMENT — ENCOUNTER SYMPTOMS
COLOR CHANGE: 1
SHORTNESS OF BREATH: 0
BACK PAIN: 1
NAUSEA: 0
TROUBLE SWALLOWING: 0
VOMITING: 0
COUGH: 0
DIARRHEA: 0
ABDOMINAL PAIN: 0

## 2025-07-10 NOTE — PROGRESS NOTES
Mitzi Zhang 1955 is a 69 y.o. female, here for evaluation of the following chief complaint(s):  New Patient (Patient here as a new patient for Elevated RF level. Previously seen at [facility].)      Assessment & Plan   ASSESSMENT/PLAN:  Mitzi Zhang 1955 is a 69 y.o. female seen in consult for polyarthritis and elevated rheumatoid factor.    1.  Polyarthritis-patient has had diffuse joint pain for 20 years or more.  Most bothersome in the hands is the first CMC joints and DIP joints.  Clinical picture is most consistent with osteoarthritis and I see no striking synovitis on exam but she does have a little tenderness in a few MCP joints on the left though again no synovitis.  She had very weak positive rheumatoid factor of 16, negative CCP antibody, negative JIM, normal inflammatory markers.  I suspect her joint pain is most likely osteoarthritis and my suspicion for an inflammatory arthritis is on the lower end but will need further workup as below.    2.  Osteoarthritis-she has osteoarthritis either way.  She can continue Aleve as needed.    3.  Rheumatoid factor positive-she is a very weak positive rheumatoid factor of 16 which I suspect is likely insignificant but again will need further workup as below.  Will also check for other potential causes of positive rheumatoid factor.    4.  Chronic back pain-she follows with pain management.  Even if she would proved to have an underlying rheumatoid arthritis this is a separate issue is rheumatoid arthritis spares the back.    If workup below is unrevealing she can follow-up with me on a as needed basis.  If I see anything concerning on workup below we will have her back for follow-up as appropriate.    1. Polyarthritis  -     14.3.3 ETA, Rheum. Arthritis; Future  -     C-Reactive Protein; Future  -     Rheumatoid Factor; Future  -     Cyclic Citrul Peptide Antibody, IgG; Future  -     XR HAND LEFT (MIN 3 VIEWS); Future  -     XR HAND RIGHT (MIN 3

## 2025-07-10 NOTE — PATIENT INSTRUCTIONS
I see no obvious signs of underlying autoimmune inflammatory arthritis but will need further workup    Have labs and Xrays

## 2025-07-11 LAB
ANA PAT SER IF-IMP: ABNORMAL
ANA SER QL IA: POSITIVE
ANA TITER: ABNORMAL
SJOGREN'S ANTIBODIES (SSA): NEGATIVE
SJOGREN'S ANTIBODIES (SSB): NEGATIVE

## 2025-07-14 ENCOUNTER — TELEPHONE (OUTPATIENT)
Age: 70
End: 2025-07-14

## 2025-07-14 LAB — CCP AB SER IA-ACNC: 1.1 U/ML (ref 0–7)

## 2025-07-14 NOTE — TELEPHONE ENCOUNTER
I spoke to Mitzi Zhang, she is willing to start from scratch with MBB to test for possible RFA.  We are still not able to get old notes from Dr. Traore so she understands starting over is the only option.  What is the first injection you would like her scheduled for.  Please advise.

## 2025-07-16 NOTE — TELEPHONE ENCOUNTER
Mitzi Zhang called in today and changed her mind and stated that she wanted to do the right side only for now.  I changed it on the OR schedule accordingly.

## 2025-07-17 LAB — 14-3-3 ETA AG SER IA-MCNC: <0.2 NG/ML

## 2025-07-22 ENCOUNTER — TELEPHONE (OUTPATIENT)
Age: 70
End: 2025-07-22

## 2025-07-22 DIAGNOSIS — M47.812 CERVICAL SPONDYLOSIS: ICD-10-CM

## 2025-07-22 NOTE — TELEPHONE ENCOUNTER
Call to Mitzi Zhang that procedure was scheduled for 07/31/2025 and that [facility] should call her a few days before for the pre op call and between 2:00 PM and 4:00 PM  the business day before with the arrival time. Instructed Mitzi to hold ibuprofen for 24 hours, Celebrex, Mobic, and naprosyn for 4 days and any aspirin containing products, CoQ 10, or fish oil for 7 days. Instructed to call office back if any questions. Mitzi verbalized understanding.    Electronically signed by Audrey Aguila RN on 7/22/2025 at 4:30 PM

## 2025-07-29 RX ORDER — GINSENG 100 MG
50 CAPSULE ORAL DAILY
COMMUNITY

## 2025-07-29 RX ORDER — ACETAMINOPHEN 160 MG
2000 TABLET,DISINTEGRATING ORAL DAILY
COMMUNITY

## 2025-07-29 RX ORDER — VITAMIN B COMPLEX
1 CAPSULE ORAL DAILY
COMMUNITY

## 2025-07-29 RX ORDER — CICLOPIROX OLAMINE 7.7 MG/G
CREAM TOPICAL 2 TIMES DAILY
COMMUNITY

## 2025-07-29 RX ORDER — MAGNESIUM GLUCONATE 27 MG(500)
500 TABLET ORAL DAILY
COMMUNITY

## 2025-07-29 NOTE — PROGRESS NOTES
[facility]  INSTRUCTIONS  ...........................................................................................................................................    [x] Parking the day of Surgery is located in the Main Entrance lot.  Entrance A, make immediate right into the surgery reception room    [x]  Bring photo ID and insurance card    [x] You may have a light breakfast day of procedure    [x]  Wear loose comfortable clothing    [x]  Please follow instructions for medications as given per your doctors office    [x] You can expect a call the business day prior to procedure between 2PM and 4PM to notify you of your arrival time.       [x] Please arrange for     ALL QUESTIONS ANSWERED AT THIS TIME.

## 2025-07-31 ENCOUNTER — HOSPITAL ENCOUNTER (OUTPATIENT)
Age: 70
Setting detail: OUTPATIENT SURGERY
Discharge: HOME | End: 2025-07-31
Attending: ANESTHESIOLOGY | Admitting: ANESTHESIOLOGY
Payer: MEDICARE

## 2025-07-31 ENCOUNTER — HOSPITAL ENCOUNTER (OUTPATIENT)
Dept: GENERAL RADIOLOGY | Age: 70
Setting detail: OUTPATIENT SURGERY
Discharge: HOME | End: 2025-08-02
Attending: ANESTHESIOLOGY
Payer: MEDICARE

## 2025-07-31 VITALS
BODY MASS INDEX: 23.05 KG/M2 | WEIGHT: 135 LBS | HEIGHT: 64 IN | DIASTOLIC BLOOD PRESSURE: 75 MMHG | HEART RATE: 69 BPM | OXYGEN SATURATION: 94 % | SYSTOLIC BLOOD PRESSURE: 162 MMHG | TEMPERATURE: 97.5 F | RESPIRATION RATE: 16 BRPM

## 2025-07-31 DIAGNOSIS — R52 PAIN MANAGEMENT: ICD-10-CM

## 2025-07-31 PROCEDURE — 6360000002 HC RX W HCPCS: Performed by: ANESTHESIOLOGY

## 2025-07-31 PROCEDURE — 7100000011 HC PHASE II RECOVERY - ADDTL 15 MIN: Performed by: ANESTHESIOLOGY

## 2025-07-31 PROCEDURE — 64490 INJ PARAVERT F JNT C/T 1 LEV: CPT | Performed by: ANESTHESIOLOGY

## 2025-07-31 PROCEDURE — 7100000010 HC PHASE II RECOVERY - FIRST 15 MIN: Performed by: ANESTHESIOLOGY

## 2025-07-31 PROCEDURE — 3600000002 HC SURGERY LEVEL 2 BASE: Performed by: ANESTHESIOLOGY

## 2025-07-31 PROCEDURE — 2709999900 HC NON-CHARGEABLE SUPPLY: Performed by: ANESTHESIOLOGY

## 2025-07-31 PROCEDURE — 64491 INJ PARAVERT F JNT C/T 2 LEV: CPT | Performed by: ANESTHESIOLOGY

## 2025-07-31 RX ORDER — BUPIVACAINE HYDROCHLORIDE 5 MG/ML
INJECTION, SOLUTION EPIDURAL; INTRACAUDAL; PERINEURAL PRN
Status: DISCONTINUED | OUTPATIENT
Start: 2025-07-31 | End: 2025-07-31 | Stop reason: ALTCHOICE

## 2025-07-31 RX ORDER — LIDOCAINE HYDROCHLORIDE 5 MG/ML
INJECTION, SOLUTION INFILTRATION; INTRAVENOUS PRN
Status: DISCONTINUED | OUTPATIENT
Start: 2025-07-31 | End: 2025-07-31 | Stop reason: ALTCHOICE

## 2025-07-31 RX ORDER — DEXAMETHASONE SODIUM PHOSPHATE 10 MG/ML
INJECTION, SOLUTION INTRAMUSCULAR; INTRAVENOUS PRN
Status: DISCONTINUED | OUTPATIENT
Start: 2025-07-31 | End: 2025-07-31 | Stop reason: ALTCHOICE

## 2025-07-31 ASSESSMENT — PAIN DESCRIPTION - ORIENTATION: ORIENTATION: RIGHT

## 2025-07-31 ASSESSMENT — PAIN SCALES - GENERAL
PAINLEVEL_OUTOF10: 0
PAINLEVEL_OUTOF10: 6
PAINLEVEL_OUTOF10: 0

## 2025-07-31 ASSESSMENT — PAIN DESCRIPTION - FREQUENCY: FREQUENCY: CONTINUOUS

## 2025-07-31 ASSESSMENT — PAIN DESCRIPTION - LOCATION: LOCATION: BACK

## 2025-07-31 ASSESSMENT — PAIN DESCRIPTION - DESCRIPTORS: DESCRIPTORS: SHARP;SHOOTING;SORE

## 2025-07-31 ASSESSMENT — PAIN DESCRIPTION - PAIN TYPE: TYPE: CHRONIC PAIN

## 2025-07-31 NOTE — H&P
Types: Cigarettes    Smokeless tobacco: Never   Vaping Use    Vaping status: Former   Substance and Sexual Activity    Alcohol use: Yes     Alcohol/week: 2.0 standard drinks of alcohol     Types: 2 Cans of beer per week    Drug use: No    Sexual activity: Not on file   Other Topics Concern    Not on file   Social History Narrative    Not on file     Social Drivers of Health     Financial Resource Strain: Not on file   Food Insecurity: Not on file   Transportation Needs: Not on file   Physical Activity: Insufficiently Active (6/19/2022)    Exercise Vital Sign     Days of Exercise per Week: 3 days     Minutes of Exercise per Session: 20 min   Stress: Not on file   Social Connections: Not on file   Intimate Partner Violence: Not At Risk (6/19/2022)    Humiliation, Afraid, Rape, and Kick questionnaire     Fear of Current or Ex-Partner: No     Emotionally Abused: No     Physically Abused: No     Sexually Abused: No   Housing Stability: Not on file       Family History   Problem Relation Age of Onset    Cancer Mother         esophageal nonsmoker    Heart Disease Father 46        several hear attacks     Dementia Sister 77        nursing home    No Known Problems Brother         age 71 [facility]    Lung Cancer Sister          REVIEW OF SYSTEMS:    CONSTITUTIONAL:  negative for  fevers, chills, sweats and fatigue    RESPIRATORY:  negative for  dry cough, cough with sputum, dyspnea, wheezing and chest pain    CARDIOVASCULAR:  negative for chest pain, dyspnea, palpitations, syncope    GASTROINTESTINAL:  negative for nausea, vomiting, change in bowel habits, diarrhea, constipation and abdominal pain    MUSCULOSKELETAL: negative for muscle weakness    SKIN: negative for itching or rashes.    BEHAVIOR/PSYCH:  negative for poor appetite, increased appetite, decreased sleep and poor concentration    All other systems negative      PHYSICAL EXAM:    VITALS:  BP (!) 168/78   Pulse 70   Temp 97.5 °F (36.4 °C) (Tympanic)   Resp

## 2025-07-31 NOTE — OP NOTE
Operative Note      Patient: Mitzi Zhang  YOB: 1955  MRN: [id number]    Date of Procedure: 2025    Pre-Op Diagnosis Codes:      * Cervical spondylosis [M47.812]    Post-Op Diagnosis: Same       Procedure(s):  #1 RIGHT CERVICAL 3, CERVICAL 4, CERVICAL 5, MEDIAL BRANCH BLOCK UNDER FLUOROSCOPIC GUIDANCE    Surgeon(s):  Hermann Suarez MD    Assistant:   * No surgical staff found *    Anesthesia: Local    Estimated Blood Loss (mL): Minimal    Complications: None    Specimens:   * No specimens in log *    Implants:  * No implants in log *      Drains: * No LDAs found *    Findings:  Present At Time Of Surgery (PATOS) (choose all levels that have infection present):  No infection present  Other Findings: good needle placement    Detailed Description of Procedure:   2025    Patient: Mitzi Zhang  :  1955  Age:  69 y.o.  Sex:  female     PRE-PROCEDURE DIAGNOSIS:   Cervical facet syndrome, cervical spondylosis.    POST-PROCEDURE DIAGNOSIS: Same.    PROCEDURE: #   Right Cervical medial branch blocks at  Levels C3, C4, and C5    SURGEON: Hermann Suarez MD    ANESTHESIA: Local    ESTIMATED BLOOD LOSS:  None.  ______________________________________________________________________  BRIEF HISTORY:  Mitzi Zhang comes in today for Right cervical facet medial branch block at levels C3, C4, and C5 . The potential complications of this procedure were discussed with her again today. She has elected to undergo the aforementioned procedure.     Mitzi’s complete History & Physical examination were reviewed in depth, a copy of which is in the chart.      DESCRIPTION OF PROCEDURE:    After confirming written and informed consent, a time-out was performed and Mitzi’s name and date of birth, the procedure to be performed as well as the plan for the location of the needle insertion were confirmed.    The patient was brought into the procedure room and placed in the prone position on the

## 2025-07-31 NOTE — DISCHARGE INSTRUCTIONS
[facility]  [place] [phone]  Dr. Daniela De Leon   Post-Pain Block/Radiofrequency  Home Going Instructions    1-Go home, rest for the remainder of the day  2-Please do not lift over 20 pounds the day of the injection  3-If you received sedation No: alcohol, driving, operating lawn mowers, plows, tractors or other dangerous equipment until next morning. Do not make important decisions or sign legal documents for 24 hours. You may experience light headedness, dizziness, nausea or sleepiness after sedation. Do not stay alone. A responsible adult must be with you for 24 hours. You could be nauseated from the medications you have received. Your IV site may be sore and bruised.    4-No dietary restrictions     5-Resume all medications the same day, blood thinners to be resumed 24 hours after injection if you were instructed to stop any.    6-Keep the surgical site clean and dry, you may shower the next morning and remove the      dressing.     7- No sitz baths, tub baths or hot tubs/swimming for 24 hours.       8- If you have any pain at the injection site(s), application of an ice pack to the area should be       helpful, 20 minutes on/20 minutes off for next 48 hours.  9- Call [facility] immediately at if you develop.  Fever greater than 100.4 F  Have bleeding or drainage from the puncture site  Have progressive Leg/arm numbness and or weakness  Loss of control of bowel and or bladder (wet/soil yourself)  Severe headache with inability to lift head  10-You may return to work the next day

## 2025-08-08 ENCOUNTER — HOSPITAL ENCOUNTER (OUTPATIENT)
Dept: CT IMAGING | Age: 70
Discharge: HOME | End: 2025-08-10
Payer: MEDICARE

## 2025-08-08 DIAGNOSIS — Z87.891 PERSONAL HISTORY OF TOBACCO USE, PRESENTING HAZARDS TO HEALTH: ICD-10-CM

## 2025-08-08 PROCEDURE — 71271 CT THORAX LUNG CANCER SCR C-: CPT

## 2025-08-13 ENCOUNTER — HOSPITAL ENCOUNTER (OUTPATIENT)
Dept: ULTRASOUND IMAGING | Age: 70
Discharge: HOME | End: 2025-08-15
Payer: MEDICARE

## 2025-08-13 DIAGNOSIS — R10.11 ABDOMINAL PAIN, RIGHT UPPER QUADRANT: ICD-10-CM

## 2025-08-13 PROCEDURE — 76700 US EXAM ABDOM COMPLETE: CPT

## 2025-08-26 ENCOUNTER — HOSPITAL ENCOUNTER (OUTPATIENT)
Dept: MRI IMAGING | Age: 70
Discharge: HOME | End: 2025-08-28
Payer: MEDICARE

## 2025-08-26 DIAGNOSIS — K83.1 OBSTRUCTION OF BILE DUCT (HCC): ICD-10-CM

## 2025-08-26 DIAGNOSIS — R10.9 ABDOMINAL PAIN, UNSPECIFIED ABDOMINAL LOCATION: ICD-10-CM

## 2025-08-26 PROCEDURE — 2500000003 HC RX 250 WO HCPCS: Performed by: RADIOLOGY

## 2025-08-26 PROCEDURE — 6360000004 HC RX CONTRAST MEDICATION: Performed by: RADIOLOGY

## 2025-08-26 PROCEDURE — A9579 GAD-BASE MR CONTRAST NOS,1ML: HCPCS | Performed by: RADIOLOGY

## 2025-08-26 PROCEDURE — 74183 MRI ABD W/O CNTR FLWD CNTR: CPT

## 2025-08-26 RX ORDER — SODIUM CHLORIDE 0.9 % (FLUSH) 0.9 %
5-40 SYRINGE (ML) INJECTION 2 TIMES DAILY
Status: DISCONTINUED | OUTPATIENT
Start: 2025-08-26 | End: 2025-08-29 | Stop reason: HOSPADM

## 2025-08-26 RX ORDER — GADOTERIDOL 279.3 MG/ML
12 INJECTION INTRAVENOUS
Status: COMPLETED | OUTPATIENT
Start: 2025-08-26 | End: 2025-08-26

## 2025-08-26 RX ADMIN — Medication 10 ML: at 10:22

## 2025-08-26 RX ADMIN — GADOTERIDOL 12 ML: 279.3 INJECTION, SOLUTION INTRAVENOUS at 10:23

## 2025-09-03 ENCOUNTER — OFFICE VISIT (OUTPATIENT)
Age: 70
End: 2025-09-03
Payer: MEDICARE

## 2025-09-03 VITALS
RESPIRATION RATE: 18 BRPM | HEART RATE: 70 BPM | HEIGHT: 64 IN | SYSTOLIC BLOOD PRESSURE: 134 MMHG | BODY MASS INDEX: 23.05 KG/M2 | TEMPERATURE: 98 F | WEIGHT: 135 LBS | DIASTOLIC BLOOD PRESSURE: 74 MMHG | OXYGEN SATURATION: 97 %

## 2025-09-03 DIAGNOSIS — M47.817 LUMBOSACRAL SPONDYLOSIS WITHOUT MYELOPATHY: ICD-10-CM

## 2025-09-03 DIAGNOSIS — M47.812 CERVICAL SPONDYLOSIS: Primary | ICD-10-CM

## 2025-09-03 DIAGNOSIS — M51.369 DEGENERATION OF INTERVERTEBRAL DISC OF LUMBAR REGION, UNSPECIFIED WHETHER PAIN PRESENT: ICD-10-CM

## 2025-09-03 DIAGNOSIS — M50.30 DDD (DEGENERATIVE DISC DISEASE), CERVICAL: ICD-10-CM

## 2025-09-03 PROCEDURE — 1090F PRES/ABSN URINE INCON ASSESS: CPT | Performed by: ANESTHESIOLOGY

## 2025-09-03 PROCEDURE — G8400 PT W/DXA NO RESULTS DOC: HCPCS | Performed by: ANESTHESIOLOGY

## 2025-09-03 PROCEDURE — 1123F ACP DISCUSS/DSCN MKR DOCD: CPT | Performed by: ANESTHESIOLOGY

## 2025-09-03 PROCEDURE — 99214 OFFICE O/P EST MOD 30 MIN: CPT | Performed by: ANESTHESIOLOGY

## 2025-09-03 PROCEDURE — 99213 OFFICE O/P EST LOW 20 MIN: CPT | Performed by: ANESTHESIOLOGY

## 2025-09-03 PROCEDURE — 1159F MED LIST DOCD IN RCRD: CPT | Performed by: ANESTHESIOLOGY

## 2025-09-03 PROCEDURE — 3017F COLORECTAL CA SCREEN DOC REV: CPT | Performed by: ANESTHESIOLOGY

## 2025-09-03 PROCEDURE — G8420 CALC BMI NORM PARAMETERS: HCPCS | Performed by: ANESTHESIOLOGY

## 2025-09-03 PROCEDURE — 4004F PT TOBACCO SCREEN RCVD TLK: CPT | Performed by: ANESTHESIOLOGY

## 2025-09-03 PROCEDURE — G8427 DOCREV CUR MEDS BY ELIG CLIN: HCPCS | Performed by: ANESTHESIOLOGY

## 2025-09-03 RX ORDER — ALPRAZOLAM 1 MG/1
TABLET ORAL
COMMUNITY
Start: 2025-08-20

## 2025-09-03 RX ORDER — TRIAMCINOLONE ACETONIDE 1 MG/G
CREAM TOPICAL
COMMUNITY
Start: 2025-07-30

## 2025-09-03 RX ORDER — NYSTATIN 100000 U/G
CREAM TOPICAL
COMMUNITY
Start: 2025-07-30

## (undated) DEVICE — 4-PORT MANIFOLD: Brand: NEPTUNE 2

## (undated) DEVICE — SOLUTION IRRIG 3000ML 1.5% GL USP UROMATIC CONT

## (undated) DEVICE — SYRINGE EPI 7ML POLYPR PLAS LUERSLIP LOSS OF RESISTANCE LO

## (undated) DEVICE — CATHETER URET 5FR L70CM OPN END SGL LUMN INJ HUB FLEXIMA

## (undated) DEVICE — SOLUTION IRRIG 3000ML 0.9% SOD CHL USP UROMATIC PLAS CONT

## (undated) DEVICE — NEEDLE HYPO 25GA L1.5IN BLU POLYPR HUB S STL REG BVL STR

## (undated) DEVICE — CATHETER URET OLV TIP 5FRX70CM FLEXIMA

## (undated) DEVICE — GLOVE ORANGE PI 7 1/2   MSG9075

## (undated) DEVICE — Z INACTIVE USE 2735373 APPLICATOR FBR LAIN COT WOOD TIP ECONOMICAL

## (undated) DEVICE — TUR/ENDOSCOPIC CABLE, 10' (3.05 M): Brand: CONMED

## (undated) DEVICE — ELECTRODE PT RET AD L9FT HI MOIST COND ADH HYDRGEL CORDED

## (undated) DEVICE — GAUZE,SPONGE,4"X4",8PLY,STRL,LF,10/TRAY: Brand: MEDLINE

## (undated) DEVICE — SOLUTION SCRB 4OZ 4% CHG H2O AIDED FOR PREOPERATIVE SKIN

## (undated) DEVICE — NON-DEHP CATHETER EXTENSION SET, MALE LUER LOCK ADAPTER

## (undated) DEVICE — 1071 S-DRP URO STLE-GAMA 10/BX,4X/C: Brand: STERI-DRAPE™

## (undated) DEVICE — CYSTO PACK: Brand: MEDLINE INDUSTRIES, INC.

## (undated) DEVICE — TUBING, SUCTION, 3/16" X 12', STRAIGHT: Brand: MEDLINE

## (undated) DEVICE — GOWN,SIRUS,FABRNF,2XL,18/CS: Brand: MEDLINE

## (undated) DEVICE — SYRINGE MED 5ML STD CLR PLAS LUERLOCK TIP N CTRL DISP

## (undated) DEVICE — 12 ML SYRINGE,LUER-LOCK TIP: Brand: MONOJECT

## (undated) DEVICE — SOLUTION SURG PREP ANTIMICROBIAL 4 OZ SKIN WND EXIDINE

## (undated) DEVICE — Z INACTIVE USE 2660664 SOLUTION IRRIG 3000ML 0.9% SOD CHL USP UROMATIC PLAS CONT

## (undated) DEVICE — SOLUTION IRRIG 3000ML STRL H2O USP UROMATIC PLAS CONT

## (undated) DEVICE — 3M™ RED DOT™ MONITORING ELECTRODE WITH FOAM TAPE AND STICKY GEL 2560, 50/BAG, 20/CASE, 72/PLT: Brand: RED DOT™

## (undated) DEVICE — BAG DRNGE COMB PK

## (undated) DEVICE — 6 ML SYRINGE LUER-LOCK TIP: Brand: MONOJECT

## (undated) DEVICE — MEDICINE CUP, GRADUATED, STER: Brand: MEDLINE

## (undated) DEVICE — BANDAGE,ADHESIVE,FABRIC,1"X3",STRL,LF: Brand: CURAD

## (undated) DEVICE — NEEDLE HYPO 18GA L1.5IN PNK POLYPR HUB S STL REG BVL STR

## (undated) DEVICE — SOLUTION IV IRRIG WATER 1000ML POUR BRL 2F7114

## (undated) DEVICE — SPECIMEN CUP W/LID: Brand: DEROYAL

## (undated) DEVICE — SOLUTION IV 1000ML 0.9% SOD CHL PH 5 INJ USP VIAFLX PLAS

## (undated) DEVICE — HOSE CONN FOR WST MGMT SYS NEPTUNE 2

## (undated) DEVICE — 6 X 9  1.75MIL 4-WALL LABGUARD: Brand: MINIGRIP COMMERCIAL LLC

## (undated) DEVICE — PAD,NON-ADHERENT,2X3,STERILE,LF,1/PK: Brand: MEDLINE